# Patient Record
Sex: FEMALE | Race: WHITE | NOT HISPANIC OR LATINO | Employment: UNEMPLOYED | URBAN - METROPOLITAN AREA
[De-identification: names, ages, dates, MRNs, and addresses within clinical notes are randomized per-mention and may not be internally consistent; named-entity substitution may affect disease eponyms.]

---

## 2018-02-04 ENCOUNTER — APPOINTMENT (EMERGENCY)
Dept: RADIOLOGY | Facility: HOSPITAL | Age: 83
DRG: 640 | End: 2018-02-04
Payer: COMMERCIAL

## 2018-02-04 ENCOUNTER — APPOINTMENT (EMERGENCY)
Dept: CT IMAGING | Facility: HOSPITAL | Age: 83
DRG: 640 | End: 2018-02-04
Payer: COMMERCIAL

## 2018-02-04 ENCOUNTER — HOSPITAL ENCOUNTER (INPATIENT)
Facility: HOSPITAL | Age: 83
LOS: 4 days | Discharge: HOME HEALTH | DRG: 640 | End: 2018-02-08
Attending: EMERGENCY MEDICINE | Admitting: INTERNAL MEDICINE
Payer: COMMERCIAL

## 2018-02-04 DIAGNOSIS — G93.41 ACUTE METABOLIC ENCEPHALOPATHY: ICD-10-CM

## 2018-02-04 DIAGNOSIS — R11.2 NAUSEA AND VOMITING, INTRACTABILITY OF VOMITING NOT SPECIFIED, UNSPECIFIED VOMITING TYPE: ICD-10-CM

## 2018-02-04 DIAGNOSIS — E87.1 ACUTE HYPONATREMIA: Primary | ICD-10-CM

## 2018-02-04 DIAGNOSIS — J18.9 RIGHT LOWER LOBE PNEUMONIA: ICD-10-CM

## 2018-02-04 DIAGNOSIS — R11.10 VOMITING: ICD-10-CM

## 2018-02-04 PROBLEM — E11.9 TYPE 2 DIABETES MELLITUS (HCC): Status: ACTIVE | Noted: 2018-02-04

## 2018-02-04 LAB
ALBUMIN SERPL BCP-MCNC: 3.8 G/DL (ref 3.5–5)
ALP SERPL-CCNC: 66 U/L (ref 46–116)
ALT SERPL W P-5'-P-CCNC: 29 U/L (ref 12–78)
ANION GAP SERPL CALCULATED.3IONS-SCNC: 10 MMOL/L (ref 4–13)
ANION GAP SERPL CALCULATED.3IONS-SCNC: 12 MMOL/L (ref 4–13)
AST SERPL W P-5'-P-CCNC: 26 U/L (ref 5–45)
BASOPHILS # BLD AUTO: 0.04 THOUSANDS/ΜL (ref 0–0.1)
BASOPHILS NFR BLD AUTO: 0 % (ref 0–1)
BILIRUB SERPL-MCNC: 1.41 MG/DL (ref 0.2–1)
BILIRUB UR QL STRIP: NEGATIVE
BUN SERPL-MCNC: 13 MG/DL (ref 5–25)
BUN SERPL-MCNC: 14 MG/DL (ref 5–25)
CALCIUM SERPL-MCNC: 7.9 MG/DL (ref 8.3–10.1)
CALCIUM SERPL-MCNC: 8.2 MG/DL (ref 8.3–10.1)
CHLORIDE SERPL-SCNC: 80 MMOL/L (ref 100–108)
CHLORIDE SERPL-SCNC: 81 MMOL/L (ref 100–108)
CLARITY UR: CLEAR
CO2 SERPL-SCNC: 20 MMOL/L (ref 21–32)
CO2 SERPL-SCNC: 22 MMOL/L (ref 21–32)
COLOR UR: YELLOW
CREAT SERPL-MCNC: 0.64 MG/DL (ref 0.6–1.3)
CREAT SERPL-MCNC: 0.7 MG/DL (ref 0.6–1.3)
EOSINOPHIL # BLD AUTO: 0.11 THOUSAND/ΜL (ref 0–0.61)
EOSINOPHIL NFR BLD AUTO: 1 % (ref 0–6)
ERYTHROCYTE [DISTWIDTH] IN BLOOD BY AUTOMATED COUNT: 13.5 % (ref 11.6–15.1)
GFR SERPL CREATININE-BSD FRML MDRD: 79 ML/MIN/1.73SQ M
GFR SERPL CREATININE-BSD FRML MDRD: 82 ML/MIN/1.73SQ M
GLUCOSE SERPL-MCNC: 148 MG/DL (ref 65–140)
GLUCOSE SERPL-MCNC: 182 MG/DL (ref 65–140)
GLUCOSE SERPL-MCNC: 195 MG/DL (ref 65–140)
GLUCOSE UR STRIP-MCNC: NEGATIVE MG/DL
HCT VFR BLD AUTO: 30.6 % (ref 34.8–46.1)
HGB BLD-MCNC: 10.5 G/DL (ref 11.5–15.4)
HGB UR QL STRIP.AUTO: NEGATIVE
KETONES UR STRIP-MCNC: ABNORMAL MG/DL
L PNEUMO1 AG UR QL IA.RAPID: NEGATIVE
LEUKOCYTE ESTERASE UR QL STRIP: NEGATIVE
LIPASE SERPL-CCNC: 91 U/L (ref 73–393)
LYMPHOCYTES # BLD AUTO: 1.85 THOUSANDS/ΜL (ref 0.6–4.47)
LYMPHOCYTES NFR BLD AUTO: 17 % (ref 14–44)
MCH RBC QN AUTO: 30.3 PG (ref 26.8–34.3)
MCHC RBC AUTO-ENTMCNC: 34.3 G/DL (ref 31.4–37.4)
MCV RBC AUTO: 88 FL (ref 82–98)
MONOCYTES # BLD AUTO: 0.95 THOUSAND/ΜL (ref 0.17–1.22)
MONOCYTES NFR BLD AUTO: 9 % (ref 4–12)
NEUTROPHILS # BLD AUTO: 7.83 THOUSANDS/ΜL (ref 1.85–7.62)
NEUTS SEG NFR BLD AUTO: 73 % (ref 43–75)
NITRITE UR QL STRIP: NEGATIVE
NRBC BLD AUTO-RTO: 0 /100 WBCS
PH UR STRIP.AUTO: 7 [PH] (ref 4.5–8)
PLATELET # BLD AUTO: 351 THOUSANDS/UL (ref 149–390)
PMV BLD AUTO: 8.4 FL (ref 8.9–12.7)
POTASSIUM SERPL-SCNC: 4.3 MMOL/L (ref 3.5–5.3)
POTASSIUM SERPL-SCNC: 4.6 MMOL/L (ref 3.5–5.3)
PROT SERPL-MCNC: 7.3 G/DL (ref 6.4–8.2)
PROT UR STRIP-MCNC: NEGATIVE MG/DL
RBC # BLD AUTO: 3.47 MILLION/UL (ref 3.81–5.12)
S PNEUM AG UR QL: NEGATIVE
SODIUM SERPL-SCNC: 112 MMOL/L (ref 136–145)
SODIUM SERPL-SCNC: 113 MMOL/L (ref 136–145)
SP GR UR STRIP.AUTO: <=1.005 (ref 1–1.03)
TROPONIN I SERPL-MCNC: <0.02 NG/ML
UROBILINOGEN UR QL STRIP.AUTO: 0.2 E.U./DL
WBC # BLD AUTO: 10.78 THOUSAND/UL (ref 4.31–10.16)

## 2018-02-04 PROCEDURE — 87449 NOS EACH ORGANISM AG IA: CPT | Performed by: EMERGENCY MEDICINE

## 2018-02-04 PROCEDURE — 80053 COMPREHEN METABOLIC PANEL: CPT | Performed by: EMERGENCY MEDICINE

## 2018-02-04 PROCEDURE — 36415 COLL VENOUS BLD VENIPUNCTURE: CPT | Performed by: NURSE PRACTITIONER

## 2018-02-04 PROCEDURE — 96360 HYDRATION IV INFUSION INIT: CPT

## 2018-02-04 PROCEDURE — 85025 COMPLETE CBC W/AUTO DIFF WBC: CPT | Performed by: EMERGENCY MEDICINE

## 2018-02-04 PROCEDURE — 93005 ELECTROCARDIOGRAM TRACING: CPT | Performed by: EMERGENCY MEDICINE

## 2018-02-04 PROCEDURE — 82948 REAGENT STRIP/BLOOD GLUCOSE: CPT

## 2018-02-04 PROCEDURE — 71046 X-RAY EXAM CHEST 2 VIEWS: CPT

## 2018-02-04 PROCEDURE — 80048 BASIC METABOLIC PNL TOTAL CA: CPT | Performed by: NURSE PRACTITIONER

## 2018-02-04 PROCEDURE — 99291 CRITICAL CARE FIRST HOUR: CPT | Performed by: NURSE PRACTITIONER

## 2018-02-04 PROCEDURE — 81003 URINALYSIS AUTO W/O SCOPE: CPT | Performed by: EMERGENCY MEDICINE

## 2018-02-04 PROCEDURE — 99285 EMERGENCY DEPT VISIT HI MDM: CPT

## 2018-02-04 PROCEDURE — 36415 COLL VENOUS BLD VENIPUNCTURE: CPT

## 2018-02-04 PROCEDURE — 84484 ASSAY OF TROPONIN QUANT: CPT | Performed by: EMERGENCY MEDICINE

## 2018-02-04 PROCEDURE — 74177 CT ABD & PELVIS W/CONTRAST: CPT

## 2018-02-04 PROCEDURE — 83690 ASSAY OF LIPASE: CPT | Performed by: EMERGENCY MEDICINE

## 2018-02-04 RX ORDER — ONDANSETRON 2 MG/ML
INJECTION INTRAMUSCULAR; INTRAVENOUS
Status: COMPLETED
Start: 2018-02-04 | End: 2018-02-04

## 2018-02-04 RX ORDER — ONDANSETRON 2 MG/ML
8 INJECTION INTRAMUSCULAR; INTRAVENOUS EVERY 8 HOURS PRN
Status: DISCONTINUED | OUTPATIENT
Start: 2018-02-04 | End: 2018-02-06

## 2018-02-04 RX ORDER — CHLORHEXIDINE GLUCONATE 0.12 MG/ML
15 RINSE ORAL EVERY 12 HOURS SCHEDULED
Status: DISCONTINUED | OUTPATIENT
Start: 2018-02-04 | End: 2018-02-05

## 2018-02-04 RX ORDER — HYDRALAZINE HYDROCHLORIDE 20 MG/ML
5 INJECTION INTRAMUSCULAR; INTRAVENOUS ONCE
Status: COMPLETED | OUTPATIENT
Start: 2018-02-04 | End: 2018-02-04

## 2018-02-04 RX ORDER — LABETALOL HYDROCHLORIDE 5 MG/ML
10 INJECTION, SOLUTION INTRAVENOUS EVERY 6 HOURS PRN
Status: DISCONTINUED | OUTPATIENT
Start: 2018-02-04 | End: 2018-02-06

## 2018-02-04 RX ORDER — SODIUM CHLORIDE 9 MG/ML
125 INJECTION, SOLUTION INTRAVENOUS CONTINUOUS
Status: DISCONTINUED | OUTPATIENT
Start: 2018-02-04 | End: 2018-02-06

## 2018-02-04 RX ORDER — HEPARIN SODIUM 5000 [USP'U]/ML
5000 INJECTION, SOLUTION INTRAVENOUS; SUBCUTANEOUS EVERY 8 HOURS SCHEDULED
Status: DISCONTINUED | OUTPATIENT
Start: 2018-02-04 | End: 2018-02-08 | Stop reason: HOSPADM

## 2018-02-04 RX ADMIN — HYDRALAZINE HYDROCHLORIDE 5 MG: 20 INJECTION INTRAMUSCULAR; INTRAVENOUS at 20:27

## 2018-02-04 RX ADMIN — AZITHROMYCIN MONOHYDRATE 500 MG: 500 INJECTION, POWDER, LYOPHILIZED, FOR SOLUTION INTRAVENOUS at 22:23

## 2018-02-04 RX ADMIN — IOHEXOL 100 ML: 350 INJECTION, SOLUTION INTRAVENOUS at 19:43

## 2018-02-04 RX ADMIN — CHLORHEXIDINE GLUCONATE 15 ML: 1.2 RINSE ORAL at 22:06

## 2018-02-04 RX ADMIN — LABETALOL 20 MG/4 ML (5 MG/ML) INTRAVENOUS SYRINGE 10 MG: at 22:12

## 2018-02-04 RX ADMIN — SODIUM CHLORIDE 1000 ML: 0.9 INJECTION, SOLUTION INTRAVENOUS at 19:10

## 2018-02-04 RX ADMIN — CEFTRIAXONE 1000 MG: 1 INJECTION, POWDER, FOR SOLUTION INTRAMUSCULAR; INTRAVENOUS at 22:23

## 2018-02-04 RX ADMIN — HEPARIN SODIUM 5000 UNITS: 5000 INJECTION, SOLUTION INTRAVENOUS; SUBCUTANEOUS at 22:06

## 2018-02-04 RX ADMIN — SODIUM CHLORIDE 125 ML/HR: 0.9 INJECTION, SOLUTION INTRAVENOUS at 21:51

## 2018-02-04 RX ADMIN — ONDANSETRON 4 MG: 2 INJECTION INTRAMUSCULAR; INTRAVENOUS at 22:06

## 2018-02-04 RX ADMIN — ONDANSETRON 4 MG: 2 INJECTION INTRAMUSCULAR; INTRAVENOUS at 21:11

## 2018-02-04 NOTE — ED PROVIDER NOTES
History  Chief Complaint   Patient presents with    Vomiting     vomiting since wed, seen at [facility]  per EMS pt much weaker than dilmamichelle now unable to ambulate, also relateds pt was recently admitted for pneumonia and dc'd on levaquin EMS noted pt has not been taking as prescribed  69-year-old female presents for evaluation of vomiting  The patient presents from home after several episodes of vomiting  The patient was recently seen at [facility] for similar type symptoms and was diagnosed with pneumonia put on Levaquin  Per EMS, the patient has had significant decline since the last transport to the emergency department  She is normally ambulatory and independent at home  The patient was unable to get up and had to be assisted onto the stretcher today  The patient is confused and only answers very basic questions  History provided by:  Patient  History limited by:  Mental status change  Vomiting   Severity:  Unable to specify  Timing:  Unable to specify      None       Past Medical History:   Diagnosis Date    DM (diabetes mellitus) ([facility] [address] )     Hypertension        Past Surgical History:   Procedure Laterality Date    NO PAST SURGERIES         History reviewed  No pertinent family history  I have reviewed and agree with the history as documented  Social History   Substance Use Topics    Smoking status: Never Smoker    Smokeless tobacco: Never Used    Alcohol use No        Review of Systems   Unable to perform ROS: Mental status change   Gastrointestinal: Positive for nausea and vomiting  Neurological: Positive for weakness  All other systems reviewed and are negative        Physical Exam  ED Triage Vitals   Temperature Pulse Respirations Blood Pressure SpO2   02/04/18 1832 02/04/18 1832 02/04/18 1832 02/04/18 1832 02/04/18 1832   98 4 °F (36 9 °C) 84 20 (!) 199/76 99 %      Temp src Heart Rate Source Patient Position - Orthostatic VS BP Location FiO2 (%)   -- 02/04/18 1916 02/04/18 1832 02/04/18 1832 --    Monitor Sitting Right arm       Pain Score       02/04/18 1832       No Pain           Orthostatic Vital Signs  Vitals:    02/04/18 1832 02/04/18 1916 02/04/18 1953   BP: (!) 199/76 (!) 195/75 (!) 203/79   Pulse: 84 93 89   Patient Position - Orthostatic VS: Sitting         Physical Exam   Constitutional: She appears well-developed and well-nourished  No distress  HENT:   Head: Normocephalic and atraumatic  Right Ear: External ear normal    Left Ear: External ear normal    Eyes: Conjunctivae and EOM are normal  Pupils are equal, round, and reactive to light  No scleral icterus  Neck: Normal range of motion  Cardiovascular: Normal rate, regular rhythm and normal heart sounds  Pulmonary/Chest: Effort normal and breath sounds normal  No respiratory distress  Abdominal: Soft  Bowel sounds are normal  There is no tenderness  There is no rebound and no guarding  Musculoskeletal: Normal range of motion  She exhibits no edema  Neurological: She is alert  She is disoriented ( Place and time)  Skin: Skin is warm and dry  No rash noted  Psychiatric: She has a normal mood and affect  Nursing note and vitals reviewed        ED Medications  Medications   hydrALAZINE (APRESOLINE) injection 5 mg (not administered)   sodium chloride 0 9 % bolus 1,000 mL (1,000 mL Intravenous New Bag 2/4/18 1910)   iohexol (OMNIPAQUE) 350 MG/ML injection (MULTI-DOSE) 100 mL (100 mL Intravenous Given 2/4/18 1943)       Diagnostic Studies  Results Reviewed     Procedure Component Value Units Date/Time    Strep Pneumoniae, Urine [[id number]]     Lab Status:  No result Specimen:  Urine     Legionella antigen, urine [[id number]]     Lab Status:  No result Specimen:  Urine     Lipase [[id number]]  (Normal) Collected:  02/04/18 1836    Lab Status:  Final result Specimen:  Blood from Arm, Right Updated:  02/04/18 1918     Lipase 91 u/L     Comprehensive metabolic panel [[id number]]  (Abnormal) Collected:  02/04/18 1836    Lab Status:  Final result Specimen:  Blood from Arm, Right Updated:  02/04/18 1909     Sodium 112 (LL) mmol/L      Potassium 4 6 mmol/L      Chloride 80 (L) mmol/L      CO2 22 mmol/L      Anion Gap 10 mmol/L      BUN 14 mg/dL      Creatinine 0 70 mg/dL      Glucose 148 (H) mg/dL      Calcium 8 2 (L) mg/dL      AST 26 U/L      ALT 29 U/L      Alkaline Phosphatase 66 U/L      Total Protein 7 3 g/dL      Albumin 3 8 g/dL      Total Bilirubin 1 41 (H) mg/dL      eGFR 79 ml/min/1 73sq m     Narrative:         National Kidney Disease Education Program recommendations are as follows:  GFR calculation is accurate only with a steady state creatinine  Chronic Kidney disease less than 60 ml/min/1 73 sq  meters  Kidney failure less than 15 ml/min/1 73 sq  meters  Troponin I [[id number]]  (Normal) Collected:  02/04/18 1836    Lab Status:  Final result Specimen:  Blood from Arm, Right Updated:  02/04/18 1900     Troponin I <0 02 ng/mL     Narrative:         [employer] Chemistry analyzer 99% cutoff is > 0 04 ng/mL in network labs    o cTnI 99% cutoff is useful only when applied to patients in the clinical setting of myocardial ischemia  o cTnI 99% cutoff should be interpreted in the context of clinical history, ECG findings and possibly cardiac imaging to establish correct diagnosis  o cTnI 99% cutoff may be suggestive but clearly not indicative of a coronary event without the clinical setting of myocardial ischemia      UA w Reflex to Microscopic [[id number]]     Lab Status:  No result Specimen:  Urine     CBC and differential [[id number]]  (Abnormal) Collected:  02/04/18 1836    Lab Status:  Final result Specimen:  Blood from Arm, Right Updated:  02/04/18 1843     WBC 10 78 (H) Thousand/uL      RBC 3 47 (L) Million/uL      Hemoglobin 10 5 (L) g/dL      Hematocrit 30 6 (L) %      MCV 88 fL      MCH 30 3 pg      MCHC 34 3 g/dL      RDW 13 5 %      MPV 8 4 (L) fL      Platelets 319 Thousands/uL      nRBC 0 /100 WBCs      Neutrophils Relative 73 %      Lymphocytes Relative 17 %      Monocytes Relative 9 %      Eosinophils Relative 1 %      Basophils Relative 0 %      Neutrophils Absolute 7 83 (H) Thousands/µL      Lymphocytes Absolute 1 85 Thousands/µL      Monocytes Absolute 0 95 Thousand/µL      Eosinophils Absolute 0 11 Thousand/µL      Basophils Absolute 0 04 Thousands/µL                  X-ray chest 2 views   Final Result by Milan Hightower MD (02/04 1958)      No active pulmonary disease  Workstation performed: [id number]         CT abdomen pelvis with contrast   Final Result by Milan Hightower MD (02/04 1955)      There are patchy airspace opacities in the right lower lobe suspicious for pneumonia  Cholelithiasis without CT evidence of acute cholecystitis  There is a 1 2 cm cystic lesion in the pancreatic head (series 2 image 31 ) In younger patients, these are typically characterize by abdomen MRI with and without IV contrast/MRCP  First alternative is pancreatic protocol abdomen and pelvic CT with    contrast  Second is abdomen MRI without contrast/MRCP  Considerations related to the patient's age and/or comorbidities may be used to alter these recommendations  The recommendations regarding pancreatic findings assumes that patient does not have family history of pancreatic cancer nor have any symptoms potentially attributable to pancreatic cystic lesions (hyperamylasemia, recent-onset diabetes, severe    epigastric pain, weight loss, steatorrhea, or jaundice ) If these conditions are not true, then management should be deferred to judgement of specialists such as gastroenterologists or oncologic surgeons  Recommendations are based on recent consensus    statements on management of pancreatic cystic lesions from [address] [place] [address] Gastroenterology Association, [employer] of [employer], the journal Pancreatology, and our own institutional consensus                 Workstation performed: [id number]                    Procedures  ECG 12 Lead Documentation  Date/Time: 2/4/2018 6:51 PM  Performed by: Wyatt Buchanan  Authorized by: Neelima ALBERT     Indications / Diagnosis:  Vomiting  ECG reviewed by me, the ED Provider: yes    Patient location:  ED  Previous ECG:     Previous ECG:  Unavailable  Interpretation:     Interpretation: normal    Rate:     ECG rate:  83    ECG rate assessment: normal    Rhythm:     Rhythm: sinus rhythm    Ectopy:     Ectopy: none    QRS:     QRS axis:  Normal    QRS intervals:  Normal  Conduction:     Conduction: abnormal      Abnormal conduction: complete RBBB and 1st degree    ST segments:     ST segments:  Normal  T waves:     T waves: non-specific    CriticalCare Time  Performed by: Wyatt Buchanan  Authorized by: Neelima ALBERT     Critical care provider statement:     Critical care time (minutes):  35    Critical care time was exclusive of:  Separately billable procedures and treating other patients and teaching time    Critical care was necessary to treat or prevent imminent or life-threatening deterioration of the following conditions:  CNS failure or compromise and endocrine crisis    Critical care was time spent personally by me on the following activities:  Blood draw for specimens, obtaining history from patient or surrogate, development of treatment plan with patient or surrogate, discussions with consultants, evaluation of patient's response to treatment, examination of patient, ordering and performing treatments and interventions, ordering and review of laboratory studies, ordering and review of radiographic studies, re-evaluation of patient's condition, review of old charts and interpretation of cardiac output measurements    I assumed direction of critical care for this patient from another provider in my specialty: no    Comments:      IV antihypertensives and treatment of severe hyponatremia with associated mental status change           Phone Contacts  ED Phone Contact    ED Course  ED Course as of Feb 04 2020   Chip Dear Feb 04, 2018 2015 I discussed the case with the intensivist  We reviewed the HPI, pertinent PMH, ED course and workup  Dr Rossana Landis agreed with plan and will admit the patient to the hospital                                 [facility]  Number of Diagnoses or Management Options  Acute hyponatremia: new and requires workup  Right lower lobe pneumonia [facility]): new and requires workup  Vomiting: new and requires workup  Diagnosis management comments: Differential diagnosis:  Urinary tract infection, dehydration, electrolyte disturbance, pneumonia, pancreatitis cholecystitis, bowel obstruction, other    All labs reviewed and utilized in the medical decision making process    All radiology studies independently viewed by me and interpreted by the radiologist          Amount and/or Complexity of Data Reviewed  Clinical lab tests: ordered and reviewed  Tests in the radiology section of CPT®: ordered and reviewed  Tests in the medicine section of CPT®: ordered and reviewed  Review and summarize past medical records: yes  Discuss the patient with other providers: yes  Independent visualization of images, tracings, or specimens: yes      CritCare Time    Disposition  Final diagnoses:   Acute hyponatremia   Vomiting   Right lower lobe pneumonia ([address] )     Time reflects when diagnosis was documented in both MDM as applicable and the Disposition within this note     Time User Action Codes Description Comment    2/4/2018  7:15 PM Ariella Jackson [E87 1] Acute hyponatremia     2/4/2018  7:16 PM Livier Hwang [R11 10] Vomiting     2/4/2018  8:17 PM Ariella Duncan Add [J18 1] Right lower lobe pneumonia [facility])       ED Disposition     ED Disposition Condition Comment    Admit  Case was discussed with Dr Rossana Landis and the patient's admission status was agreed to be Admission Status: inpatient status to the service of Dr Rossana Landis           Follow-up Information None       Patient's Medications    No medications on file     No discharge procedures on file      ED Provider  Electronically Signed by           Augusta Nelson DO  02/04/18 6849

## 2018-02-05 PROBLEM — J69.0 ASPIRATION PNEUMONIA OF RIGHT LOWER LOBE DUE TO GASTRIC SECRETIONS (HCC): Status: ACTIVE | Noted: 2018-02-04

## 2018-02-05 LAB
ANION GAP SERPL CALCULATED.3IONS-SCNC: 11 MMOL/L (ref 4–13)
ANION GAP SERPL CALCULATED.3IONS-SCNC: 12 MMOL/L (ref 4–13)
ATRIAL RATE: 83 BPM
BASOPHILS # BLD AUTO: 0.02 THOUSANDS/ΜL (ref 0–0.1)
BASOPHILS NFR BLD AUTO: 0 % (ref 0–1)
BUN SERPL-MCNC: 10 MG/DL (ref 5–25)
BUN SERPL-MCNC: 11 MG/DL (ref 5–25)
CALCIUM SERPL-MCNC: 7.7 MG/DL (ref 8.3–10.1)
CALCIUM SERPL-MCNC: 8.1 MG/DL (ref 8.3–10.1)
CHLORIDE SERPL-SCNC: 83 MMOL/L (ref 100–108)
CHLORIDE SERPL-SCNC: 85 MMOL/L (ref 100–108)
CO2 SERPL-SCNC: 20 MMOL/L (ref 21–32)
CO2 SERPL-SCNC: 20 MMOL/L (ref 21–32)
CREAT SERPL-MCNC: 0.66 MG/DL (ref 0.6–1.3)
CREAT SERPL-MCNC: 0.67 MG/DL (ref 0.6–1.3)
EOSINOPHIL # BLD AUTO: 0.01 THOUSAND/ΜL (ref 0–0.61)
EOSINOPHIL NFR BLD AUTO: 0 % (ref 0–6)
ERYTHROCYTE [DISTWIDTH] IN BLOOD BY AUTOMATED COUNT: 13.6 % (ref 11.6–15.1)
GFR SERPL CREATININE-BSD FRML MDRD: 80 ML/MIN/1.73SQ M
GFR SERPL CREATININE-BSD FRML MDRD: 81 ML/MIN/1.73SQ M
GLUCOSE SERPL-MCNC: 121 MG/DL (ref 65–140)
GLUCOSE SERPL-MCNC: 128 MG/DL (ref 65–140)
GLUCOSE SERPL-MCNC: 137 MG/DL (ref 65–140)
GLUCOSE SERPL-MCNC: 149 MG/DL (ref 65–140)
GLUCOSE SERPL-MCNC: 211 MG/DL (ref 65–140)
GLUCOSE SERPL-MCNC: 212 MG/DL (ref 65–140)
HCT VFR BLD AUTO: 29 % (ref 34.8–46.1)
HGB BLD-MCNC: 10.3 G/DL (ref 11.5–15.4)
LYMPHOCYTES # BLD AUTO: 1.95 THOUSANDS/ΜL (ref 0.6–4.47)
LYMPHOCYTES NFR BLD AUTO: 13 % (ref 14–44)
MCH RBC QN AUTO: 30.8 PG (ref 26.8–34.3)
MCHC RBC AUTO-ENTMCNC: 35.5 G/DL (ref 31.4–37.4)
MCV RBC AUTO: 87 FL (ref 82–98)
MONOCYTES # BLD AUTO: 1.08 THOUSAND/ΜL (ref 0.17–1.22)
MONOCYTES NFR BLD AUTO: 7 % (ref 4–12)
NEUTROPHILS # BLD AUTO: 11.72 THOUSANDS/ΜL (ref 1.85–7.62)
NEUTS SEG NFR BLD AUTO: 80 % (ref 43–75)
NRBC BLD AUTO-RTO: 0 /100 WBCS
P AXIS: 79 DEGREES
PLATELET # BLD AUTO: 361 THOUSANDS/UL (ref 149–390)
PMV BLD AUTO: 8.7 FL (ref 8.9–12.7)
POTASSIUM SERPL-SCNC: 3.8 MMOL/L (ref 3.5–5.3)
POTASSIUM SERPL-SCNC: 3.9 MMOL/L (ref 3.5–5.3)
PR INTERVAL: 226 MS
QRS AXIS: 93 DEGREES
QRSD INTERVAL: 144 MS
QT INTERVAL: 392 MS
QTC INTERVAL: 460 MS
RBC # BLD AUTO: 3.34 MILLION/UL (ref 3.81–5.12)
SODIUM SERPL-SCNC: 114 MMOL/L (ref 136–145)
SODIUM SERPL-SCNC: 117 MMOL/L (ref 136–145)
SODIUM SERPL-SCNC: 118 MMOL/L (ref 136–145)
SODIUM SERPL-SCNC: 121 MMOL/L (ref 136–145)
SODIUM SERPL-SCNC: 122 MMOL/L (ref 136–145)
SODIUM SERPL-SCNC: 126 MMOL/L (ref 136–145)
T WAVE AXIS: 32 DEGREES
VENTRICULAR RATE: 83 BPM
WBC # BLD AUTO: 14.78 THOUSAND/UL (ref 4.31–10.16)

## 2018-02-05 PROCEDURE — 82948 REAGENT STRIP/BLOOD GLUCOSE: CPT

## 2018-02-05 PROCEDURE — 99233 SBSQ HOSP IP/OBS HIGH 50: CPT | Performed by: INTERNAL MEDICINE

## 2018-02-05 PROCEDURE — 93010 ELECTROCARDIOGRAM REPORT: CPT | Performed by: INTERNAL MEDICINE

## 2018-02-05 PROCEDURE — 80048 BASIC METABOLIC PNL TOTAL CA: CPT | Performed by: NURSE PRACTITIONER

## 2018-02-05 PROCEDURE — 85025 COMPLETE CBC W/AUTO DIFF WBC: CPT | Performed by: NURSE PRACTITIONER

## 2018-02-05 PROCEDURE — 84295 ASSAY OF SERUM SODIUM: CPT | Performed by: INTERNAL MEDICINE

## 2018-02-05 PROCEDURE — 84295 ASSAY OF SERUM SODIUM: CPT | Performed by: NURSE PRACTITIONER

## 2018-02-05 PROCEDURE — 80048 BASIC METABOLIC PNL TOTAL CA: CPT | Performed by: INTERNAL MEDICINE

## 2018-02-05 RX ORDER — SODIUM CHLORIDE 9 MG/ML
75 INJECTION, SOLUTION INTRAVENOUS CONTINUOUS
Status: DISCONTINUED | OUTPATIENT
Start: 2018-02-05 | End: 2018-02-07

## 2018-02-05 RX ORDER — DEXTROSE MONOHYDRATE 50 MG/ML
125 INJECTION, SOLUTION INTRAVENOUS CONTINUOUS
Status: DISCONTINUED | OUTPATIENT
Start: 2018-02-05 | End: 2018-02-06

## 2018-02-05 RX ORDER — PROMETHAZINE HYDROCHLORIDE 25 MG/ML
12.5 INJECTION, SOLUTION INTRAMUSCULAR; INTRAVENOUS ONCE
Status: COMPLETED | OUTPATIENT
Start: 2018-02-05 | End: 2018-02-05

## 2018-02-05 RX ADMIN — CEFTRIAXONE 1000 MG: 1 INJECTION, POWDER, FOR SOLUTION INTRAMUSCULAR; INTRAVENOUS at 21:22

## 2018-02-05 RX ADMIN — ONDANSETRON 8 MG: 2 INJECTION INTRAMUSCULAR; INTRAVENOUS at 06:06

## 2018-02-05 RX ADMIN — SODIUM CHLORIDE 125 ML/HR: 0.9 INJECTION, SOLUTION INTRAVENOUS at 08:11

## 2018-02-05 RX ADMIN — PROMETHAZINE HYDROCHLORIDE 12.5 MG: 25 INJECTION INTRAMUSCULAR; INTRAVENOUS at 09:54

## 2018-02-05 RX ADMIN — ONDANSETRON 8 MG: 2 INJECTION INTRAMUSCULAR; INTRAVENOUS at 13:22

## 2018-02-05 RX ADMIN — DEXTROSE 125 ML/HR: 5 SOLUTION INTRAVENOUS at 23:07

## 2018-02-05 RX ADMIN — SODIUM CHLORIDE 125 ML/HR: 0.9 INJECTION, SOLUTION INTRAVENOUS at 16:32

## 2018-02-05 RX ADMIN — CHLORHEXIDINE GLUCONATE 15 ML: 1.2 RINSE ORAL at 08:16

## 2018-02-05 RX ADMIN — HEPARIN SODIUM 5000 UNITS: 5000 INJECTION, SOLUTION INTRAVENOUS; SUBCUTANEOUS at 13:26

## 2018-02-05 RX ADMIN — HEPARIN SODIUM 5000 UNITS: 5000 INJECTION, SOLUTION INTRAVENOUS; SUBCUTANEOUS at 21:22

## 2018-02-05 RX ADMIN — HEPARIN SODIUM 5000 UNITS: 5000 INJECTION, SOLUTION INTRAVENOUS; SUBCUTANEOUS at 05:33

## 2018-02-05 RX ADMIN — LABETALOL 20 MG/4 ML (5 MG/ML) INTRAVENOUS SYRINGE 10 MG: at 13:21

## 2018-02-05 NOTE — H&P
History & Physical Exam - [address] [facility] 80 y o  female MRN: [id number]  Unit/Bed#: ICU 15 Encounter: [id number]      Assessment/Plan:  1  Severe hyponatremia  · Given the severity of the patient's hyponatremia we will admit her to the step-down unit for evaluation and monitoring  This will likely require greater than a 2 midnight stay therefore the patient will be placed in inpatient status  · We will continue the patient on normal saline for now as she is not having seizures requiring 3% normal saline  We will monitor her sodium level closely every 4 hours with our goal to increase it no more than 8-10 mEq in a 24 hour time frame  2  Community acquired pneumonia  · Ceftriaxone and zithromax  · Will check legionella and strep antigen  3  Diabetes Mellitus  · We will place the patient on subcutaneous insulin with a goal to maintain her blood glucose between 140 and 180  3  Nausea and vomiting  · This is likely multifactorial related to her community-acquired pneumonia and possibly contributing to her hyponatremia  · We will continue her on Zosyn for now  4  Hypertension  · The patient was treated with a dose of labetalol with improvement of her blood pressure  5   Encephalopathy likely related to 1   6   1 2 cm cystic lesion in the pancreatic head  · We will discuss with the patient further when her mental status improves regarding her family or personal history of pancreatic cancer  We may need to consult Gastroenterology for assistance with the care of this lesion  Critical Care Time: 38 minutes  Documented critical care time excludes any procedures documented elsewhere  It also excludes any family updates    _____________________________________________________________________      HPI:    Mary Kay is a 80 y o  female who presents with a complaint of nausea and vomiting  The patient was recently seen at [facility] and diagnosed with community-acquired pneumonia    She was placed on Levaquin  There is some question that the patient was not able to take the medication appropriately  She called 911 today because she was complaining of increasing nausea vomiting and weakness  Currently she complains of not feeling well with nausea  She denies chest pain or shortness of breath       Review of Systems:    Full 14 point review of systems was performed  Aside from what was mentioned in the HPI, it is otherwise negative  Historical Information   Past Medical History:   Diagnosis Date    DM [facility]diabetes mellitus) ([facility] [address] )     Hypertension      Past Surgical History:   Procedure Laterality Date    NO PAST SURGERIES       Social History   History   Alcohol Use No     History   Drug Use No     History   Smoking Status    Never Smoker   Smokeless Tobacco    Never Used       Family History:   History reviewed  No pertinent family history  Medications:  Pertinent medications were reviewed    Current Facility-Administered Medications:  azithromycin 500 mg Intravenous Q24H Evertt Heys, CRNP    cefTRIAXone 1,000 mg Intravenous Q24H Evertt Heys, CRNP Last Rate: 1,000 mg (02/04/18 2223)   chlorhexidine 15 mL Swish & Spit Q12H [address] Evertt Heys, CRNP    heparin (porcine) 5,000 Units Subcutaneous [facility] Evertt Heys, CRNP    labetalol 10 mg Intravenous Q6H PRN Evertt Heys, CRNP    ondansetron 8 mg Intravenous Q8H PRN Evertt Heys, CRNP    sodium chloride 125 mL/hr Intravenous Continuous Evertt Heys, CRNP Last Rate: 125 mL/hr (02/04/18 2151)         No Known Allergies      Vitals:   /70 (BP Location: Left arm)   Pulse 94   Temp 98 4 °F (36 9 °C)   Resp (!) 26   Wt 64 9 kg (143 lb)   SpO2 98%   There is no height or weight on file to calculate BMI    SpO2: 98 %,   SpO2 Activity: At Rest,   O2 Device: Nasal cannula    No intake or output data in the 24 hours ending 02/04/18 2225  Invasive Devices     Peripheral Intravenous Line            Peripheral IV 02/04/18 Left Hand less than 1 day    Peripheral IV 02/04/18 Right Antecubital less than 1 day                Physical Exam:  Gen:  Awake and slightly confused  HEENT:  Pupils are equal round reactive to light  Neck:  Supple negative for lymphadenopathy  Chest:  Diminished in the bases right greater than left  Cor:  Regular rate and rhythm  Abd:  Soft and nontender  Ext:  There is no edema clubbing or cyanosis  Neuro:  Awake but slightly confused  Able to move her extremities  Skin:  Warm and dry      Diagnostic Data:  Lab: I have personally reviewed pertinent lab results  ,   CBC:    Results from last 7 days  Lab Units 02/04/18  1836   WBC Thousand/uL 10 78*   HEMOGLOBIN g/dL 10 5*   HEMATOCRIT % 30 6*   PLATELETS Thousands/uL 351      CMP: Lab Results   Component Value Date     (LL) 02/04/2018    K 4 3 02/04/2018    CL 81 (L) 02/04/2018    CO2 20 (L) 02/04/2018    ANIONGAP 12 02/04/2018    BUN 13 02/04/2018    CREATININE 0 64 02/04/2018    GLUCOSE 182 (H) 02/04/2018    CALCIUM 7 9 (L) 02/04/2018    AST 26 02/04/2018    ALT 29 02/04/2018    ALKPHOS 66 02/04/2018    PROT 7 3 02/04/2018    BILITOT 1 41 (H) 02/04/2018    EGFR 82 02/04/2018   ,   PT/INR: No results found for: PT, INR,   Magnesium: No results found for: MAG,  Phosphorous: No results found for: PHOS    ABG: No results found for: PHART, XJR3CHR, PO2ART, LZI7OEC, P8LCUPKX, BEART, SOURCE,     Microbiology:      Imaging: I have personally reviewed the pertinent imaging studies on the PACS system  CT scan of the abdomen and pelvis was performed  It reveals patchy airspace opacities in the right lower lobe concerning for pneumonia  There is cholelithiasis without evidence of acute cholecystitis  There is a 1 2 cm cystic lesion in the pancreatic head      Cardiac/EKG/telemetry/Echo:     Results from last 7 days  Lab Units 02/04/18  1836   TROPONIN I ng/mL <0 02           VTE Prophylaxis: Heparin    Code Status: Level 1 - Full Code    [place] Grade, CRNP    Portions of the record may have been created with voice recognition software  Occasional wrong word or "sound a like" substitutions may have occurred due to the inherent limitations of voice recognition software  Read the chart carefully and recognize, using context, where substitutions have occurred

## 2018-02-05 NOTE — ED NOTES
Pt does not recall medications being taken at home and does not have a list with her        Akin Melo, BOBBY  02/04/18 1713

## 2018-02-05 NOTE — PROGRESS NOTES
Progress Note - [address] 80 y o  female MRN: [id number]  Unit/Bed#: ICU 15 Encounter: [id number]    Assessment/Plan:  1  Severe hyponatremia  · Patient has had a slow rise in her sodium level  We will continue her on normal saline for now with our goal to maintain a increase of 8-10 mEq in a 24 hour time frame  · We will continue to monitor her sodium level every 4 hours  2  Community-acquired pneumonia  · We will continue her on ceftriaxone and Zithromax for now pending her culture results  3  Nausea and vomiting, questionable gastroenteritis  · The patient continues to have nausea which may in part be related to an contributing to her severe hyponatremia  There is a possibility that the patient has a coexisting gastroenteritis  We will continue with supportive care for now  4  Diabetes mellitus type 2  · We will continue the patient on sliding scale insulin with a goal to maintain the blood glucose between 140 and 180  5  Encephalopathy, likely related to 1  Critical Care Time:   Documented critical care time excludes any procedures documented elsewhere  It also excludes any family updates    _____________________________________________________________________    HPI/24hr events:   Events of the admission are noted  The patient still has some nausea this morning      Medications:    Current Facility-Administered Medications:  azithromycin 500 mg Intravenous Q24H VERONIQUE Felipe    cefTRIAXone 1,000 mg Intravenous Q24H VERONIQUE Felipe Last Rate: 1,000 mg (02/04/18 2223)   chlorhexidine 15 mL Swish & Spit Q12H [facility] VERONIQUE Felipe    heparin (porcine) 5,000 Units Subcutaneous [facility] VERONIQUE Felipe    labetalol 10 mg Intravenous Q6H PRN VERONIQUE Felipe    ondansetron 8 mg Intravenous Q8H PRN VERONIQUE Felipe    sodium chloride 125 mL/hr Intravenous Continuous VERONIQUE Felipe Last Rate: 125 mL/hr (02/04/18 2151)         sodium chloride 125 mL/hr Last Rate: 125 mL/hr (02/04/18 2151)         Physical exam:  Vitals: There is no height or weight on file to calculate BMI  Blood pressure 164/69, pulse 97, temperature 98 4 °F (36 9 °C), resp  rate (!) 32, weight 64 9 kg (143 lb), SpO2 100 %  ,  Temp  Min: 98 4 °F (36 9 °C)  Max: 98 4 °F (36 9 °C)  IBW: -92 5 kg    SpO2: 100 %  SpO2 Activity: At Rest  O2 Device: Nasal cannula    No intake or output data in the 24 hours ending 02/05/18 0619    Invasive/non-invasive ventilation settings:   Respiratory    Lab Data (Last 4 hours)    None         O2/Vent Data (Last 4 hours)    None              Invasive Devices     Peripheral Intravenous Line            Peripheral IV 02/04/18 Left Hand less than 1 day    Peripheral IV 02/04/18 Right Antecubital less than 1 day                  Physical Exam:  Gen:  Awake but slow to respond  HEENT:  Pupils are equal round reactive to light  Neck:  Supple negative for lymphadenopathy  Chest:  Diminished in the bases bilaterally  Cor:  Regular rate and rhythm  Abd:  Soft and nontender  Ext:  There is no significant edema clubbing or cyanosis  Neuro:  Awake but slow to respond, able to move her extremities but weak  Skin:  Warm and dry      Diagnostic Data:  Lab: I have personally reviewed pertinent lab results     CBC:     Results from last 7 days  Lab Units 02/05/18  0527 02/04/18  1836   WBC Thousand/uL 14 78* 10 78*   HEMOGLOBIN g/dL 10 3* 10 5*   HEMATOCRIT % 29 0* 30 6*   PLATELETS Thousands/uL 361 351       CMP:     Results from last 7 days  Lab Units 02/05/18  0527 02/05/18  0125 02/04/18  2100 02/04/18  1836   SODIUM mmol/L 117* 114* 113* 112*   POTASSIUM mmol/L 3 9 3 8 4 3 4 6   CHLORIDE mmol/L 85* 83* 81* 80*   CO2 mmol/L 20* 20* 20* 22   BUN mg/dL 10 11 13 14   CREATININE mg/dL 0 66 0 67 0 64 0 70   CALCIUM mg/dL 8 1* 7 7* 7 9* 8 2*   TOTAL PROTEIN g/dL  --   --   --  7 3   BILIRUBIN TOTAL mg/dL  --   --   --  1 41*   ALK PHOS U/L  --   --   --  66   ALT U/L  --   --   --  29   AST U/L  --   --   --  26   GLUCOSE RANDOM mg/dL 149* 211* 182* 148*     PT/INR:   No results found for: PT, INR,   Magnesium:     Phosphorous:       Microbiology:    Results from last 7 days  Lab Units 02/04/18 2022   STREP PNEUMONIAE ANTIGEN, URINE  Negative   LEGIONELLA URINARY ANTIGEN  Negative       Imaging:      Cardiac lab/EKG/telemetry/ECHO:       VTE Prophylaxis:  Heparin    Code Status: Level 1 - Full Code    VERONIQUE Small    Portions of the record may have been created with voice recognition software  Occasional wrong word or "sound a like" substitutions may have occurred due to the inherent limitations of voice recognition software  Read the chart carefully and recognize, using context, where substitutions have occurred

## 2018-02-05 NOTE — SOCIAL WORK
CM met with the patient to do a general open; the patient lives by herself in a bi-level home  Her POA is her niece, Shayy Olson [phone]  Her neighbors son does daily check ins with the patient was Shayy Olson lives a half an hour away  The patient is independent with adls and ambulation, when well she drives herself to all appointments  Her PCP is Dr Gutierrez Abreu with [facility]  She is current with [facility] VNA services - Delfina sent a referral for return of care  Hx of rehab after a THR - [facility] and then to an correction afterwards  [facility] uses [facility] on [address] for rx needs  Shayy Olson will provide transport home at discharge  DELFINA did speak with POA today over the phone, she plans to visit the patient tonight, she stated that the patient typically has all care provided through [facility]  CM following for any discharge needs

## 2018-02-05 NOTE — CASE MANAGEMENT
Initial Clinical Review    Admission: Date/Time/Statement: 2/4/18 @ 2018     Orders Placed This Encounter   Procedures    Inpatient Admission (expected length of stay for this patient is greater than two midnights)     Standing Status:   Standing     Number of Occurrences:   1     Order Specific Question:   Admitting Physician     Answer:   Lawrence Range [[id number]]     Order Specific Question:   Level of Care     Answer:   Level 1 Stepdown [13]     Order Specific Question:   Estimated length of stay     Answer:   More than 2 Midnights     Order Specific Question:   Certification     Answer:   I certify that inpatient services are medically necessary for this patient for a duration of greater than two midnights  See H&P and MD Progress Notes for additional information about the patient's course of treatment  ED: Date/Time/Mode of Arrival:   ED Arrival Information     Expected Arrival Acuity Means of Arrival Escorted By Service Admission Type    - 2/4/2018 18:28 Emergent Ambulance [place] [facility] Pulmonology Emergency    Arrival Complaint    Nausea          Chief Complaint:   Chief Complaint   Patient presents with    Vomiting     vomiting since wed, seen at [facility]  per EMS pt much weaker than thurs now unable to ambulate, also relateds pt was recently admitted for pneumonia and dc'd on levaquin EMS noted pt has not been taking as prescribed  History of Illness:    Micaela Santos is a 80 y o  female who presents with a complaint of nausea and vomiting  The patient was recently seen at [facility] and diagnosed with community-acquired pneumonia  She was placed on Levaquin  There is some question that the patient was not able to take the medication appropriately  She called 911 today because she was complaining of increasing nausea vomiting and weakness  Currently she complains of not feeling well with nausea  She denies chest pain or shortness of breath       ED Vital Signs:   ED Triage Vitals   Temperature Pulse Respirations Blood Pressure SpO2   02/04/18 1832 02/04/18 1832 02/04/18 1832 02/04/18 1832 02/04/18 1832   98 4 °F (36 9 °C) 84 20 (!) 199/76 99 %      Temp Source Heart Rate Source Patient Position - Orthostatic VS BP Location FiO2 (%)   02/05/18 0724 02/04/18 1916 02/04/18 1832 02/04/18 1832 --   Temporal Monitor Sitting Right arm       Pain Score       02/04/18 1832       No Pain        Wt Readings from Last 1 Encounters:   02/04/18 64 9 kg (143 lb)       Vital Signs (abnormal):    above    Abnormal Labs/Diagnostic Test Results:   NA  112  Chloride   80  Total  Bili   1 41  WBC   10 78  H/H   10 5/30 6  Abs neutro    7 82  Ct  Abd/pelvis: There are patchy airspace opacities in the right lower lobe suspicious for pneumonia  Cholelithiasis without CT evidence of acute cholecystitis  There is a 1 2 cm cystic lesion in the pancreatic head (series 2 image 31 ) In younger patients, these are typically characterize by abdomen MRI with and without IV contrast/MRCP  First alternative is pancreatic protocol abdomen and pelvic CT with   contrast  Second is abdomen MRI without contrast/MRCP   Considerations related to the patient's age and/or comorbidities may be used to alter these recommendations  CXR:  NAD    ED Treatment:   Medication Administration from 02/04/2018 1828 to 02/04/2018 2142       Date/Time Order Dose Route Action Action by Comments     02/04/2018 2021 sodium chloride 0 9 % bolus 1,000 mL 0 mL Intravenous Stopped Cathey Meigs, RN      02/04/2018 1910 sodium chloride 0 9 % bolus 1,000 mL 1,000 mL Intravenous 5655 Frist Aragon Sera Llamas RN      02/04/2018 1943 iohexol (OMNIPAQUE) 350 MG/ML injection (MULTI-DOSE) 100 mL 100 mL Intravenous Given Lillie Friday 02/04/2018 2027 hydrALAZINE (APRESOLINE) injection 5 mg 5 mg Intravenous Given Cathey Meigs, RN      02/04/2018 2111 ondansetron (ZOFRAN) 4 mg/2 mL injection **AcuDose Override Pull** 4 mg  Given Sirisha Patient Sera Llamas RN Verbal order obtained from [facility] in ICU  Made aware pt was vomiting  Past Medical/Surgical History: Active Ambulatory Problems     Diagnosis Date Noted    No Active Ambulatory Problems     Resolved Ambulatory Problems     Diagnosis Date Noted    No Resolved Ambulatory Problems     Past Medical History:   Diagnosis Date    DM (diabetes mellitus) ([address] )     Hypertension        Admitting Diagnosis: Acute hyponatremia [E87 1]  Nausea [R11 0]  Vomiting [R11 10]  Right lower lobe pneumonia ([address] ) [J18 1]    Age/Sex: 80 y o  female    1  Assessment/Plan:    Severe hyponatremia  · Given the severity of the patient's hyponatremia we will admit her to the step-down unit for evaluation and monitoring  This will likely require greater than a 2 midnight stay therefore the patient will be placed in inpatient status  · We will continue the patient on normal saline for now as she is not having seizures requiring 3% normal saline  We will monitor her sodium level closely every 4 hours with our goal to increase it no more than 8-10 mEq in a 24 hour time frame  2  Community acquired pneumonia  · Ceftriaxone and zithromax  · Will check legionella and strep antigen  3  Diabetes Mellitus  · We will place the patient on subcutaneous insulin with a goal to maintain her blood glucose between 140 and 180  3  Nausea and vomiting  · This is likely multifactorial related to her community-acquired pneumonia and possibly contributing to her hyponatremia  · We will continue her on Zosyn for now  4  Hypertension  ? The patient was treated with a dose of labetalol with improvement of her blood pressure  5   Encephalopathy likely related to 1   6   1 2 cm cystic lesion in the pancreatic head  ? We will discuss with the patient further when her mental status improves regarding her family or personal history of pancreatic cancer    We may need to consult Gastroenterology for assistance with the care of this lesion        Admission Orders:   IP    2/4  @    2018  Scheduled Meds:   Current Facility-Administered Medications:  cefTRIAXone 1,000 mg Intravenous Q24H Thirza Gums, CRNP Last Rate: 1,000 mg (02/04/18 2223)   chlorhexidine 15 mL Swish & Spit Q12H [facility] Thirza Gums, CRNP    heparin (porcine) 5,000 Units Subcutaneous [facility] Thirza Gums, CRNP    labetalol 10 mg Intravenous Q6H PRN Thirza Gums, CRNP    ondansetron 8 mg Intravenous Q8H PRN Thirza Gums, CRNP    sodium chloride 125 mL/hr Intravenous Continuous Thirza Gums, CRNP Last Rate: 125 mL/hr (02/05/18 0811)     Continuous Infusions:   sodium chloride 125 mL/hr Last Rate: 125 mL/hr (02/05/18 0811)     PRN Meds: labetalol    ondansetron     ICU  NPO  Na  Level  Q 4 hrs  IV  zofran PRN ( x1  2/4  And  X 1  2/5  Thus far)    Progress  Note  2/5  1  Severe hyponatremia  · Patient has had a slow rise in her sodium level  We will continue her on normal saline for now with our goal to maintain a increase of 8-10 mEq in a 24 hour time frame  · We will continue to monitor her sodium level every 4 hours  2  Community-acquired pneumonia  · We will continue her on ceftriaxone and Zithromax for now pending her culture results  3  Nausea and vomiting, questionable gastroenteritis  · The patient continues to have nausea which may in part be related to an contributing to her severe hyponatremia  There is a possibility that the patient has a coexisting gastroenteritis  We will continue with supportive care for now  4  Diabetes mellitus type 2  · We will continue the patient on sliding scale insulin with a goal to maintain the blood glucose between 140 and 180  5  Encephalopathy, likely related to 1  Thank you,  [facility] in the [facility] by Darvin Trinidad for 2017  Network Utilization Review Department  Phone: [phone];  Fax [phone]  ATTENTION: The Network Utilization Review Department is now centralized for our 7 Facilities  Make a note that we have a new phone and fax numbers for our Department  Please call with any questions or concerns to [phone] and carefully follow the prompts so that you are directed to the right person  All voicemails are confidential  Fax any determinations, approvals, denials, and requests for initial or continue stay review clinical to [phone]  Due to HIGH CALL volume, it would be easier if you could please send faxed requests to expedite your requests and in part, help us provide discharge notifications faster

## 2018-02-06 PROBLEM — G93.41 ACUTE METABOLIC ENCEPHALOPATHY: Status: ACTIVE | Noted: 2018-02-06

## 2018-02-06 PROBLEM — I25.10 CAD (CORONARY ARTERY DISEASE): Status: ACTIVE | Noted: 2018-02-06

## 2018-02-06 PROBLEM — E78.5 HYPERLIPIDEMIA: Status: ACTIVE | Noted: 2018-02-06

## 2018-02-06 PROBLEM — G93.41 ACUTE METABOLIC ENCEPHALOPATHY: Status: RESOLVED | Noted: 2018-02-06 | Resolved: 2018-02-06

## 2018-02-06 PROBLEM — I10 HTN (HYPERTENSION), BENIGN: Status: ACTIVE | Noted: 2018-02-06

## 2018-02-06 PROBLEM — F41.1 GAD (GENERALIZED ANXIETY DISORDER): Status: ACTIVE | Noted: 2018-02-06

## 2018-02-06 PROBLEM — R11.2 NAUSEA & VOMITING: Status: ACTIVE | Noted: 2018-02-06

## 2018-02-06 LAB
ANION GAP SERPL CALCULATED.3IONS-SCNC: 7 MMOL/L (ref 4–13)
BUN SERPL-MCNC: 5 MG/DL (ref 5–25)
CALCIUM SERPL-MCNC: 8.1 MG/DL (ref 8.3–10.1)
CHLORIDE SERPL-SCNC: 97 MMOL/L (ref 100–108)
CO2 SERPL-SCNC: 27 MMOL/L (ref 21–32)
CREAT SERPL-MCNC: 0.65 MG/DL (ref 0.6–1.3)
GFR SERPL CREATININE-BSD FRML MDRD: 81 ML/MIN/1.73SQ M
GLUCOSE SERPL-MCNC: 117 MG/DL (ref 65–140)
GLUCOSE SERPL-MCNC: 137 MG/DL (ref 65–140)
GLUCOSE SERPL-MCNC: 152 MG/DL (ref 65–140)
GLUCOSE SERPL-MCNC: 163 MG/DL (ref 65–140)
OSMOLALITY UR/SERPL-RTO: 275 MMOL/KG (ref 282–298)
OSMOLALITY UR: 173 MMOL/KG
POTASSIUM SERPL-SCNC: 3.4 MMOL/L (ref 3.5–5.3)
SODIUM SERPL-SCNC: 128 MMOL/L (ref 136–145)
SODIUM SERPL-SCNC: 128 MMOL/L (ref 136–145)
SODIUM SERPL-SCNC: 130 MMOL/L (ref 136–145)
SODIUM SERPL-SCNC: 130 MMOL/L (ref 136–145)
SODIUM SERPL-SCNC: 131 MMOL/L (ref 136–145)
SODIUM SERPL-SCNC: 131 MMOL/L (ref 136–145)

## 2018-02-06 PROCEDURE — 84295 ASSAY OF SERUM SODIUM: CPT | Performed by: INTERNAL MEDICINE

## 2018-02-06 PROCEDURE — 83930 ASSAY OF BLOOD OSMOLALITY: CPT | Performed by: INTERNAL MEDICINE

## 2018-02-06 PROCEDURE — 83935 ASSAY OF URINE OSMOLALITY: CPT | Performed by: INTERNAL MEDICINE

## 2018-02-06 PROCEDURE — 92610 EVALUATE SWALLOWING FUNCTION: CPT

## 2018-02-06 PROCEDURE — 99233 SBSQ HOSP IP/OBS HIGH 50: CPT | Performed by: INTERNAL MEDICINE

## 2018-02-06 PROCEDURE — 82948 REAGENT STRIP/BLOOD GLUCOSE: CPT

## 2018-02-06 PROCEDURE — 80048 BASIC METABOLIC PNL TOTAL CA: CPT | Performed by: NURSE PRACTITIONER

## 2018-02-06 RX ORDER — ALPRAZOLAM 0.25 MG/1
0.25 TABLET ORAL
Status: DISCONTINUED | OUTPATIENT
Start: 2018-02-06 | End: 2018-02-08 | Stop reason: HOSPADM

## 2018-02-06 RX ORDER — TELMISARTAN 80 MG/1
80 TABLET ORAL DAILY
COMMUNITY
Start: 2017-09-13 | End: 2018-02-08 | Stop reason: HOSPADM

## 2018-02-06 RX ORDER — FAMOTIDINE 20 MG/1
20 TABLET, FILM COATED ORAL DAILY
Status: DISCONTINUED | OUTPATIENT
Start: 2018-02-06 | End: 2018-02-07

## 2018-02-06 RX ORDER — AMLODIPINE BESYLATE 10 MG/1
10 TABLET ORAL DAILY
COMMUNITY
Start: 2017-09-13

## 2018-02-06 RX ORDER — ALBUTEROL SULFATE 90 UG/1
2 AEROSOL, METERED RESPIRATORY (INHALATION) EVERY 4 HOURS PRN
Status: DISCONTINUED | OUTPATIENT
Start: 2018-02-06 | End: 2018-02-08 | Stop reason: HOSPADM

## 2018-02-06 RX ORDER — AMLODIPINE BESYLATE 10 MG/1
10 TABLET ORAL DAILY
Status: DISCONTINUED | OUTPATIENT
Start: 2018-02-06 | End: 2018-02-08 | Stop reason: HOSPADM

## 2018-02-06 RX ORDER — PRAVASTATIN SODIUM 20 MG
20 TABLET ORAL
Status: DISCONTINUED | OUTPATIENT
Start: 2018-02-06 | End: 2018-02-08 | Stop reason: HOSPADM

## 2018-02-06 RX ORDER — FAMOTIDINE 20 MG/1
20 TABLET, FILM COATED ORAL DAILY
COMMUNITY
Start: 2018-01-31 | End: 2018-11-02

## 2018-02-06 RX ORDER — ASPIRIN 81 MG/1
81 TABLET, CHEWABLE ORAL DAILY
Status: DISCONTINUED | OUTPATIENT
Start: 2018-02-06 | End: 2018-02-07

## 2018-02-06 RX ORDER — ALBUTEROL SULFATE 90 UG/1
2 AEROSOL, METERED RESPIRATORY (INHALATION) EVERY 6 HOURS
COMMUNITY
Start: 2018-01-31 | End: 2018-12-16 | Stop reason: ALTCHOICE

## 2018-02-06 RX ORDER — ONDANSETRON 2 MG/ML
4 INJECTION INTRAMUSCULAR; INTRAVENOUS EVERY 8 HOURS PRN
Status: DISCONTINUED | OUTPATIENT
Start: 2018-02-06 | End: 2018-02-08 | Stop reason: HOSPADM

## 2018-02-06 RX ORDER — METOPROLOL TARTRATE 100 MG/1
100 TABLET ORAL EVERY 12 HOURS SCHEDULED
Status: DISCONTINUED | OUTPATIENT
Start: 2018-02-06 | End: 2018-02-08 | Stop reason: HOSPADM

## 2018-02-06 RX ORDER — CEFDINIR 300 MG/1
300 CAPSULE ORAL EVERY 12 HOURS SCHEDULED
Status: DISCONTINUED | OUTPATIENT
Start: 2018-02-06 | End: 2018-02-08 | Stop reason: HOSPADM

## 2018-02-06 RX ORDER — SIMVASTATIN 10 MG
10 TABLET ORAL
COMMUNITY
Start: 2017-09-13

## 2018-02-06 RX ORDER — ALPRAZOLAM 0.25 MG/1
0.25 TABLET ORAL 2 TIMES DAILY
COMMUNITY
Start: 2018-02-02

## 2018-02-06 RX ORDER — METOPROLOL TARTRATE 100 MG/1
100 TABLET ORAL 2 TIMES DAILY
COMMUNITY
Start: 2017-09-13

## 2018-02-06 RX ADMIN — CEFDINIR 300 MG: 300 CAPSULE ORAL at 11:15

## 2018-02-06 RX ADMIN — AMLODIPINE BESYLATE 10 MG: 10 TABLET ORAL at 11:14

## 2018-02-06 RX ADMIN — METOPROLOL TARTRATE 100 MG: 100 TABLET ORAL at 11:14

## 2018-02-06 RX ADMIN — FAMOTIDINE 20 MG: 20 TABLET, FILM COATED ORAL at 11:14

## 2018-02-06 RX ADMIN — PRAVASTATIN SODIUM 20 MG: 40 TABLET ORAL at 16:56

## 2018-02-06 RX ADMIN — SERTRALINE HYDROCHLORIDE 50 MG: 50 TABLET ORAL at 11:14

## 2018-02-06 RX ADMIN — ALPRAZOLAM 0.25 MG: 0.25 TABLET ORAL at 16:55

## 2018-02-06 RX ADMIN — HEPARIN SODIUM 5000 UNITS: 5000 INJECTION, SOLUTION INTRAVENOUS; SUBCUTANEOUS at 06:10

## 2018-02-06 RX ADMIN — DEXTROSE 125 ML/HR: 5 SOLUTION INTRAVENOUS at 06:34

## 2018-02-06 RX ADMIN — HEPARIN SODIUM 5000 UNITS: 5000 INJECTION, SOLUTION INTRAVENOUS; SUBCUTANEOUS at 21:06

## 2018-02-06 RX ADMIN — ASPIRIN 81 MG 81 MG: 81 TABLET ORAL at 11:14

## 2018-02-06 RX ADMIN — CEFDINIR 300 MG: 300 CAPSULE ORAL at 21:06

## 2018-02-06 NOTE — PLAN OF CARE
Problem: SLP ADULT - SWALLOWING, IMPAIRED  Goal: Initial SLP swallow eval performed  Outcome: Completed Date Met: 02/06/18

## 2018-02-06 NOTE — PROGRESS NOTES
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  Her serum osm was low    -patient was started on normal saline  Her sodium has gradually corrected to her baseline  It is 131 today  Will hold IV fluids  Will monitor her over night  If her sodium remains stable off the IV fluid support, she may be discharged home    -patient has a history of chronic hyponatremia  She will be referred back to her primary care physician at [facility]   Will not institute a new workup for her chronic hyponatremia at this time  9-ywrbropma-bimnsnap pneumonia:  Patient was noted to have a right lower lobe pneumonia on admission  On her prior admissions to [facility] earlier this week she was noted to have infiltrate involving both bases  Patient had not been compliant with her Levaquin dose    -patient's case was discussed with the pulmonologist   On her current imaging she has a small infiltrate at the right base  This is likely related to her previously diagnosed pneumonia rather than a new pneumonia  As patient had possible GI upset related to her levofloxacin, her antibiotics were changed to ceftriaxone  Patient had presented with nausea and vomiting  It is possible that this infiltrate is related to aspiration pneumonia/pneumonitis  However as she is clinically improving the pulmonary service does not recommend any anaerobic coverage such as Flagyl to her regimen      -patient is currently afebrile  She denies any shortness of breath or cough  3-dysphagia: There were concerns that patient was exhibiting signs of dysphagia  In light of her right lower lobe infiltrate which could be representative of aspiration, patient will undergo a speech therapy evaluation    Patient currently denies any signs or symptoms of aspiration  4-acute metabolic encephalopathy:  Patient reportedly had confusion upon presentation  This was felt to be secondary to acute toxic/metabolic encephalopathy secondary to her significant hyponatremia  Her sodium was 112  This has improved  She is currently awake, alert, and oriented  5-nausea/vomiting:  Patient presented with nausea and vomiting  Review of her [facility] charts, it is notable that she has frequent admissions and ER visits and nausea and vomiting are present during those visits  I reviewed her family physicians notes, any relates the patient has significant anxiety, which manifests frequently as nausea  This had improved when she was placed on higher doses of Xanax in addition to her Zoloft  Patient's niece has also noted the correlation of nausea with patient's episodes of anxiety    -discussed with them the option of a GI workup, however that is declined at this time  Patient currently denies any nausea, vomiting, abdominal discomfort  She is tolerating p o  without any difficulty  -continue home famotidine    -will restart her home Xanax    -her back to her primary care provider for follow-up    6-diabetes type 2:  On diet therapy as an outpatient  7-essential hypertension:  Reviewed patient's discharge summary from [facility]   Will restart her home medications which consist of Micardis 80 mg, metoprolol 100 mg b i d , Norvasc 10 mg daily     8-1 2 cm cystic lesion in the pancreatic head:  Incidental finding  Will for back to primary care provider for further follow-up    9-ambulatory dysfunction/generalized weakness:  Noted on admission  Secondary to her hyponatremia  Has markedly improved  Await physical therapy/occupational therapy evaluation    10-coronary artery disease: Without any evidence of acute ischemia  Continue aspirin, beta-blocker, and statin      11-generalized anxiety disorder: Restart home Xanax  Patient's dose was recently increased and she relates that she takes it scheduled 0 25 mg t i d  12-hyperlipidemia:  Continue statin    13-family:  Updated niece Austen Gardner via phone  Reviewed test results and tx plan  VTE Pharmacologic Prophylaxis: Heparin  VTE Mechanical Prophylaxis: sequential compression device    Certification Statement: The patient will continue to require additional inpatient hospital stay due to need for further acute intervention for hyponatremia  Status: inpatient     Total time spent including her [facility] chart, current records and chart  Discussion with Dr Sumanth De Leon, as well as patient's interview, exam, and discussion with her nurse:  60 minutes    ===================================================================    Subjective:  Patient is examined and interviewed  Her chart and records are reviewed  Her [facility] records as well as previous hospitalizations and January and February of 2018 were reviewed  Review of [facility] chart patient was admitted February 1st to February 2nd with acute/chronic hyponatremia and nausea  Her nausea began after she took Levaquin and so it was attributed to the antibiotics  Her nausea was also felt to have been exacerbated by her anxiety  She has a history of previous palpitations attributed to anxiety that were treated with Xanax  Patient was also admitted January 28 through the 31st for hyponatremia and pneumonia  She was treated with levofloxacin  She was diagnosed with SIADH  She had nausea at that time that was attributed to her anxiety  Patient was also diagnosed with influenza A on January 21st     Patient's niece notes that patient had skip her dose of Levaquin as she thought it was upsetting her stomach  Patient denies any current nausea or vomiting  She denies any GI upset  She denies any abdominal pain or cramping  She denies any diarrhea    She is tolerating p o  without any difficulties  She denies any shortness of breath  She denies any current cough  She denies any chest congestion or phlegm  She denies any dizziness or lightheadedness  She denies any weakness  Patient relates she feels at her baseline and strenuously requesting discharge home  Patient notes she has a history of anxiety and is requesting her Xanax  She relates that she takes that 3 times a day scheduled at home 1st thing in the morning, at 3:00 p m , and at bedtime  She denies any pain anywhere at all  She denies any headache, chest pain, back pain, abdominal pain  Physical Exam:   Temp:  [98 5 °F (36 9 °C)-99 3 °F (37 4 °C)] 99 3 °F (37 4 °C)  HR:  [] 90  Resp:  [19-44] 22  BP: (122-190)/(59-91) 162/77    Gen:  Pleasant, non-tachypnic, non-dyspnic  Conversant  Sitting up in a chair  Smiling  Communicative  Heart: regular rate and rhythm, S1S2 present, no murmur, rub or gallop  Lungs: clear to ausculatation bilaterally  No wheezing, crackles, or rhonchi  No accessory muscle use or respiratory distress  Abd: soft, non-tender, non-distended  NABS, no guarding, rebound or peritoneal signs  Extremities: no clubbing, cyanosis or edema  2+pedal pulses bilaterally  Full range of motion  Neuro: awake, alert and oriented  Cranial nerves 2-12 intact  Strength and sensation grossly intact  Skin: warm and dry: no petechiae, purpura and rash      LABS:     Results from last 7 days  Lab Units 02/05/18 0527 02/04/18  1836   WBC Thousand/uL 14 78* 10 78*   HEMOGLOBIN g/dL 10 3* 10 5*   HEMATOCRIT % 29 0* 30 6*   PLATELETS Thousands/uL 361 351       Results from last 7 days  Lab Units 02/06/18  0613 02/06/18  0236 02/05/18  2231  02/05/18  0527 02/05/18  0125   SODIUM mmol/L 131*  131* 130* 126*  < > 117* 114*   POTASSIUM mmol/L 3 4*  --   --   --  3 9 3 8   CHLORIDE mmol/L 97*  --   --   --  85* 83*   CO2 mmol/L 27  --   --   --  20* 20*   BUN mg/dL 5  --   --   -- 10 11   CREATININE mg/dL 0 65  --   --   --  0 66 0 67   GLUCOSE RANDOM mg/dL 163*  --   --   --  149* 211*   CALCIUM mg/dL 8 1*  --   --   --  8 1* 7 7*   < > = values in this interval not displayed  Hospital Data:      Urinalysis:  Negative nitrates/leukocyte esterase  Negative Legionella/strep pneumoniae antigen      ---------------------------------------------------------------------------------------------------------------  This note has been constructed using a voice recognition system

## 2018-02-06 NOTE — SPEECH THERAPY NOTE
Speech Language/Pathology  Speech/Language Pathology  Assessment    Patient Name: Mario Telles  [id number]'J Date: 2/6/2018     Problem List  Patient Active Problem List   Diagnosis    Pneumonia due to infectious organism    Hyponatremia    Type 2 diabetes mellitus ([facility] )    Nausea & vomiting    CAD (coronary artery disease)    ALEJANDRA (generalized anxiety disorder)    Hyperlipidemia    HTN (hypertension), benign     Past Medical History  Past Medical History:   Diagnosis Date    DM (diabetes mellitus) ([facility]     Hypertension      Past Surgical History  Past Surgical History:   Procedure Laterality Date    NO PAST SURGERIES         ER note:  17-year-old female presents for evaluation of vomiting  The patient presents from home after several episodes of vomiting  The patient was recently seen at [facility] for similar type symptoms and was diagnosed with pneumonia put on Levaquin  Per EMS, the patient has had significant decline since the last transport to the emergency department  She is normally ambulatory and independent at home  The patient was unable to get up and had to be assisted onto the stretcher today  The patient is confused and only answers very basic questions  2/5 pulmonary:  Attestation signed by Unique Stapleton MD at 2/5/2018 9:53 AM (Updated)   Split/Shared Statement:  I have seen and examined the patient  I have discussed the patient's care with the advanced practitioner  I personally reviewed the laboratory data  Imaging Studies were reviewed personally on the [facility] system:  Chest x-ray is clear  Abdominal CT scan shows a minor focus at the right base of pneumonia  EXAMINATION:  Alert and interactive  She appears comfortable  Mucous membranes are dry  She is complaining of nausea  Lungs are clear  Heart is regular  Abdomen is soft and nontender  Extremities without any edema      I agree with the findings, assessment, and plan as outlined in the note by Sonja Santoyo Jodee  Continue cautious hydration for hyponatremia  The degree of pneumonia is quite minimal   There is no cough or phlegm production  Continue current treatment for now  No evidence of gastroenteritis by CT scan  Unclear cause her nausea and vomiting at this point  Documentation clarification:  Encephalopathy is acute metabolic secondary to hyponatremia  Given the nausea and vomiting this is likely a small focus of aspiration pneumonia of the right lower lobe continue ceftriaxone, discontinue azithromycin  Would treat for 7 days total            Reason for consult:  R/o aspiration  Determine safest and least restrictive diet  Current diet:  Dysphagia 2 w/ nectar thick  Premorbid diet[de-identified]  regular  Previous VBS:  none  O2 requirement:  none  Voice/Speech:  wnl  Social:  Lives alone  Follows commands:  yes                    Cognitive Status:  A&O  Oral mech exam:  Full dentition    Items administered:  Chicken breast sandwich, javier slaw, thin liquids, meds whole w/ thin liquids  Oral stage:  WNL  Lip closure:+  Mastication:+  Bolus formation:+  Bolus control:+  Transfer:+  Oral residue:-  Pocketing:-    Pharyngeal stage: WNL  Swallow promptness:+  Laryngeal rise:+  Wet voice:-  Throat clear:-  Cough:-  Secondary swallows:-  Audible swallows:-  No s/s aspiration    Esophageal stage:  No s/s reported    Summary:  Pt presents w/ no s/s oral or pharyngeal dysphagia  Good intake  No s/s aspiration  Recommendations:  Diet:regular  Liquid:thin  Meds:whole w/ thin  Supervision: not needed at this time  Positioning:Upright    Results d/w:  Pt, nursing, dietician (student)    Eval only  D/c

## 2018-02-07 LAB
ANION GAP SERPL CALCULATED.3IONS-SCNC: 7 MMOL/L (ref 4–13)
BUN SERPL-MCNC: 6 MG/DL (ref 5–25)
CALCIUM SERPL-MCNC: 8.2 MG/DL (ref 8.3–10.1)
CHLORIDE SERPL-SCNC: 98 MMOL/L (ref 100–108)
CO2 SERPL-SCNC: 27 MMOL/L (ref 21–32)
CREAT SERPL-MCNC: 0.62 MG/DL (ref 0.6–1.3)
ERYTHROCYTE [DISTWIDTH] IN BLOOD BY AUTOMATED COUNT: 13.8 % (ref 11.6–15.1)
GFR SERPL CREATININE-BSD FRML MDRD: 82 ML/MIN/1.73SQ M
GLUCOSE SERPL-MCNC: 115 MG/DL (ref 65–140)
GLUCOSE SERPL-MCNC: 127 MG/DL (ref 65–140)
GLUCOSE SERPL-MCNC: 128 MG/DL (ref 65–140)
GLUCOSE SERPL-MCNC: 137 MG/DL (ref 65–140)
GLUCOSE SERPL-MCNC: 200 MG/DL (ref 65–140)
HCT VFR BLD AUTO: 28.8 % (ref 34.8–46.1)
HGB BLD-MCNC: 9.3 G/DL (ref 11.5–15.4)
MCH RBC QN AUTO: 29.8 PG (ref 26.8–34.3)
MCHC RBC AUTO-ENTMCNC: 32.3 G/DL (ref 31.4–37.4)
MCV RBC AUTO: 92 FL (ref 82–98)
OSMOLALITY UR/SERPL-RTO: 276 MMOL/KG (ref 282–298)
PLATELET # BLD AUTO: 276 THOUSANDS/UL (ref 149–390)
PMV BLD AUTO: 9.3 FL (ref 8.9–12.7)
POTASSIUM SERPL-SCNC: 3.5 MMOL/L (ref 3.5–5.3)
RBC # BLD AUTO: 3.12 MILLION/UL (ref 3.81–5.12)
SODIUM SERPL-SCNC: 130 MMOL/L (ref 136–145)
SODIUM SERPL-SCNC: 132 MMOL/L (ref 136–145)
WBC # BLD AUTO: 6.77 THOUSAND/UL (ref 4.31–10.16)

## 2018-02-07 PROCEDURE — 82948 REAGENT STRIP/BLOOD GLUCOSE: CPT

## 2018-02-07 PROCEDURE — 97166 OT EVAL MOD COMPLEX 45 MIN: CPT

## 2018-02-07 PROCEDURE — 97163 PT EVAL HIGH COMPLEX 45 MIN: CPT

## 2018-02-07 PROCEDURE — G8988 SELF CARE GOAL STATUS: HCPCS

## 2018-02-07 PROCEDURE — 84295 ASSAY OF SERUM SODIUM: CPT | Performed by: INTERNAL MEDICINE

## 2018-02-07 PROCEDURE — 99232 SBSQ HOSP IP/OBS MODERATE 35: CPT | Performed by: INTERNAL MEDICINE

## 2018-02-07 PROCEDURE — 80048 BASIC METABOLIC PNL TOTAL CA: CPT | Performed by: INTERNAL MEDICINE

## 2018-02-07 PROCEDURE — G8987 SELF CARE CURRENT STATUS: HCPCS

## 2018-02-07 PROCEDURE — G8979 MOBILITY GOAL STATUS: HCPCS

## 2018-02-07 PROCEDURE — 83930 ASSAY OF BLOOD OSMOLALITY: CPT | Performed by: INTERNAL MEDICINE

## 2018-02-07 PROCEDURE — G8978 MOBILITY CURRENT STATUS: HCPCS

## 2018-02-07 PROCEDURE — 85027 COMPLETE CBC AUTOMATED: CPT | Performed by: INTERNAL MEDICINE

## 2018-02-07 RX ORDER — POLYETHYLENE GLYCOL 3350 17 G/17G
17 POWDER, FOR SOLUTION ORAL DAILY
Status: DISCONTINUED | OUTPATIENT
Start: 2018-02-07 | End: 2018-02-08

## 2018-02-07 RX ORDER — ASPIRIN 81 MG/1
81 TABLET ORAL DAILY
Status: DISCONTINUED | OUTPATIENT
Start: 2018-02-08 | End: 2018-02-08 | Stop reason: HOSPADM

## 2018-02-07 RX ORDER — PANTOPRAZOLE SODIUM 40 MG/1
40 TABLET, DELAYED RELEASE ORAL
Status: DISCONTINUED | OUTPATIENT
Start: 2018-02-07 | End: 2018-02-08 | Stop reason: HOSPADM

## 2018-02-07 RX ORDER — DOCUSATE SODIUM 100 MG/1
100 CAPSULE, LIQUID FILLED ORAL 2 TIMES DAILY
Status: DISCONTINUED | OUTPATIENT
Start: 2018-02-07 | End: 2018-02-08

## 2018-02-07 RX ADMIN — METOPROLOL TARTRATE 100 MG: 100 TABLET ORAL at 08:59

## 2018-02-07 RX ADMIN — ALPRAZOLAM 0.25 MG: 0.25 TABLET ORAL at 06:31

## 2018-02-07 RX ADMIN — AMLODIPINE BESYLATE 10 MG: 10 TABLET ORAL at 08:59

## 2018-02-07 RX ADMIN — CEFDINIR 300 MG: 300 CAPSULE ORAL at 21:52

## 2018-02-07 RX ADMIN — DOCUSATE SODIUM 100 MG: 100 CAPSULE, LIQUID FILLED ORAL at 17:04

## 2018-02-07 RX ADMIN — SERTRALINE HYDROCHLORIDE 50 MG: 50 TABLET ORAL at 08:59

## 2018-02-07 RX ADMIN — DOCUSATE SODIUM 100 MG: 100 CAPSULE, LIQUID FILLED ORAL at 13:12

## 2018-02-07 RX ADMIN — ALPRAZOLAM 0.25 MG: 0.25 TABLET ORAL at 21:52

## 2018-02-07 RX ADMIN — HEPARIN SODIUM 5000 UNITS: 5000 INJECTION, SOLUTION INTRAVENOUS; SUBCUTANEOUS at 13:12

## 2018-02-07 RX ADMIN — PRAVASTATIN SODIUM 20 MG: 40 TABLET ORAL at 17:04

## 2018-02-07 RX ADMIN — HEPARIN SODIUM 5000 UNITS: 5000 INJECTION, SOLUTION INTRAVENOUS; SUBCUTANEOUS at 06:30

## 2018-02-07 RX ADMIN — ASPIRIN 81 MG 81 MG: 81 TABLET ORAL at 08:59

## 2018-02-07 RX ADMIN — CEFDINIR 300 MG: 300 CAPSULE ORAL at 11:48

## 2018-02-07 RX ADMIN — HEPARIN SODIUM 5000 UNITS: 5000 INJECTION, SOLUTION INTRAVENOUS; SUBCUTANEOUS at 21:52

## 2018-02-07 RX ADMIN — FAMOTIDINE 20 MG: 20 TABLET, FILM COATED ORAL at 08:58

## 2018-02-07 RX ADMIN — POLYETHYLENE GLYCOL 3350 17 G: 17 POWDER, FOR SOLUTION ORAL at 13:12

## 2018-02-07 RX ADMIN — METOPROLOL TARTRATE 100 MG: 100 TABLET ORAL at 21:52

## 2018-02-07 RX ADMIN — ALPRAZOLAM 0.25 MG: 0.25 TABLET ORAL at 17:04

## 2018-02-07 NOTE — PHYSICAL THERAPY NOTE
PT EVALUATION    80 y o     [id number]    Acute hyponatremia [E87 1]  Nausea [R11 0]  Vomiting [R11 10]  Right lower lobe pneumonia (HCC) [J18 1]    Past Medical History:   Diagnosis Date    DM (diabetes mellitus) ([facility] [address] )     Hypertension          Past Surgical History:   Procedure Laterality Date    NO PAST SURGERIES        02/07/18 1230   Note Type   Note type Eval only   Pain Assessment   Pain Score No Pain   Home Living   Type of [address] Two level;1/2 bath on main level;Stairs to enter with rails  (6 ALBAN)   Bathroom Shower/Tub Tub/shower unit   Bathroom Toilet Standard   Bathroom Equipment Grab bars in shower   [facility]  (not using PTA)   Additional Comments States keeps RW handy if she would need it, but reports usually does not use  Prior Function   Level of [place] Independent with ADLs and functional mobility   Lives With Alone   Receives Help From Family  (niece Radha Mancuso)   ADL Assistance Independent   IADLs Independent   Falls in the last 6 months 0   Comments I without AD  reports driving PTA  Restrictions/Precautions   Weight Bearing Precautions Per Order No   Other Precautions Fall Risk;Hard of hearing   General   Additional Pertinent History Pt is 81 y/o female who was recently d/c from [facility] p flu, admitted with hyponatremia and pnuemonia  PT consulted  Family/Caregiver Present No   Cognition   Overall Cognitive Status WFL   Comments [place]   RUE Assessment   RUE Assessment WFL   LUE Assessment   LUE Assessment WFL   RLE Assessment   RLE Assessment WFL  (grossly 4-/5)   LLE Assessment   LLE Assessment WFL  (grossly 4-/5)   Bed Mobility   Additional Comments OOB in chair upon arrival    Transfers   Sit to Stand 5  Supervision   Additional items Increased time required;Verbal cues;Armrests   Stand to Sit 5  Supervision   Additional items Assist x 1; Armrests; Increased time required   Additional Comments cues for hand placement Ambulation/Elevation   Gait pattern Decreased foot clearance; Inconsistent shin; Short stride  (exaggerated arm swing )   Gait Assistance 5  Supervision   Additional items Verbal cues   Assistive Device (none)   Distance Amb 35'x1   Balance   Static Sitting Good   Dynamic Sitting Fair +   Static Standing Fair   Dynamic Standing Fair -   Ambulatory Fair -   Endurance Deficit   Endurance Deficit Yes   Endurance Deficit Description fatigue   Activity Tolerance   Activity Tolerance Patient limited by fatigue;Patient tolerated treatment well   Medical Staff Made Aware Nurse, [address] ok for PT to see   Assessment   Prognosis Good   Problem List Decreased strength;Decreased endurance; Impaired balance;Decreased mobility   Assessment Pt is 79 y/o female admitted wtih hyponatremia and pnuemonia  Recently d/c from [facility] p flu  Lives alone and baseline reports independent without use of AD, although has a walker  Currently with decrese in strength, activity tolerance, balance and mobility  S for trnasfers  Close S for ambulation  Gait with slowed pace, exaggerated arm swing and inconsistent pace  Will benefit from PT in order to optimize independent function and improve activity tolerance  May benefit from STR as lives alone, however pt does not feel need and goal to return home  Only support named as [facility]  Barriers to Discharge Decreased caregiver support; Inaccessible home environment   Goals   Patient Goals go to the bathroom and will feel better  STG Expiration Date 02/14/18   Short Term Goal #1 7 days:  I for bed mobility  I for transfers  Amb with LRAD prn > 200'x1 wtih [place]  Negotiate steps wtih S  Improve overall strength and balance 1/2 grade  Increase activity tolerance to 45 minutes  Treatment Day 0   Plan   Treatment/Interventions Functional transfer training;LE strengthening/ROM; Elevations; Therapeutic exercise; Endurance training;Patient/family training;Equipment eval/education; Bed mobility;Gait training; Compensatory technique education;Continued evaluation;Spoke to nursing;OT   PT Frequency 5x/wk   Recommendation   Recommendation Short-term skilled PT; Home PT  (may benefit from STR at this time, pt desires hhpt)   Additional Comments If to home, must acheive IPPT goals       Barthel Index   Feeding 10   Bathing 0   Grooming Score 5   Dressing Score 10   Bladder Score 10   Bowels Score 10   Toilet Use Score 5   Transfers (Bed/Chair) Score 10   Mobility (Level Surface) Score 0   Stairs Score 0   Barthel Index Score 60   History: co - morbidities, fall risk, lives alone, steps, limited social support, recent d/c from hospital p flu  Exam: impairments in locomotion, musculoskeletal, balance, activity tolerance, barthel 60  Clinical: unpredictable ( fall risk, decreased activity tolerance)  Complexity: high    Troy Day, PT

## 2018-02-07 NOTE — PLAN OF CARE
Problem: OCCUPATIONAL THERAPY ADULT  Goal: Performs self-care activities at highest level of function for planned discharge setting  See evaluation for individualized goals  Treatment Interventions: ADL retraining, Functional transfer training, UE strengthening/ROM, Endurance training, Patient/family training, Compensatory technique education, Energy conservation, Activityengagement, Equipment evaluation/education, Cognitive reorientation          See flowsheet documentation for full assessment, interventions and recommendations  Limitation: Decreased ADL status, Decreased UE strength, Decreased cognition, Decreased endurance, Decreased Safe judgement during ADL, Decreased self-care trans, Decreased high-level ADLs  Prognosis: Good  Assessment: Pt is a 80 y o  female seen for OT evaluation s/p admit to [facility] on 2/4/2018 w/ hyponatremia, Pneumonia due to infectious organism  Comorbidities affecting pt's functional performance at time of assessment include: recent diagnosis of flu at [facility] in January, dysphagia, acute metabolic encephalopathy, anxiety, HTN, h/o THR  Personal factors affecting pt at time of IE include: lives alone, hard of hearing  Prior to admission, pt was living alone in [facility] and pt reports indepenent w/ ADLs, independent IADLs, independent functional transfers and mobility w/ no AD, driving  Upon evaluation: Pt requires supervision sit<>Stand transfers, supervision functional transfers and mobility w/ no AD, MIN assist LB ADLS, MIN assist toileting 2* the following deficits impacting occupational performance: increased fatigue, questionable STM, impaired insight, impaired balance, impaired activity tolerance, impaired insight  Pt to benefit from continued skilled OT tx while in the hospital to address deficits as defined above and maximize level of functional independence w ADL's and functional mobility   Occupational Performance areas to address include: grooming, bathing/shower, toilet hygiene, dressing, medication management, functional mobility, clothing management, cleaning and meal prep, formal cognitive evaluation, home safety education  From OT standpoint, recommendation at time of d/c would be short term rehab vs  HOME w/ OT and family support        OT Discharge Recommendation:  (STR vs  HOME OT)         Comments: Daniel Murphy MS, OTR/L

## 2018-02-07 NOTE — PLAN OF CARE
Problem: PHYSICAL THERAPY ADULT  Goal: Performs mobility at highest level of function for planned discharge setting  See evaluation for individualized goals  Treatment/Interventions: Functional transfer training, LE strengthening/ROM, Elevations, Therapeutic exercise, Endurance training, Patient/family training, Equipment eval/education, Bed mobility, Gait training, Compensatory technique education, Continued evaluation, Spoke to nursing, OT          See flowsheet documentation for full assessment, interventions and recommendations  Prognosis: Good  Problem List: Decreased strength, Decreased endurance, Impaired balance, Decreased mobility  Assessment: Pt is 79 y/o female admitted wtih hyponatremia and pnuemonia  Recently d/c from [facility]  Lives alone and baseline reports independent without use of AD, although has a walker  Currently with decrese in strength, activity tolerance, balance and mobility  S for trnasfers  Close S for ambulation  Gait with slowed pace, exaggerated arm swing and inconsistent pace  Will benefit from PT in order to optimize independent function and improve activity tolerance  May benefit from STR as lives alone, however pt does not feel need and goal to return home  Only support named as chanelle Abreu  Barriers to Discharge: Decreased caregiver support, Inaccessible home environment     Recommendation: Short-term skilled PT, Home PT (may benefit from STR at this time, pt desires hhpt)          See flowsheet documentation for full assessment

## 2018-02-07 NOTE — OCCUPATIONAL THERAPY NOTE
[address] Evaluation     Patient Name: [facility]  [id number] Date: 2/7/2018  Problem List  Patient Active Problem List   Diagnosis    Pneumonia due to infectious organism    Hyponatremia    Type 2 diabetes mellitus ([facility] [address] )    Nausea & vomiting    CAD (coronary artery disease)    ALEJANDRA (generalized anxiety disorder)    Hyperlipidemia    HTN (hypertension), benign     Past Medical History  Past Medical History:   Diagnosis Date    DM (diabetes mellitus) ([facility] [address] )     Hypertension      Past Surgical History  Past Surgical History:   Procedure Laterality Date    NO PAST SURGERIES             02/07/18 1228   Note Type   Note type Eval/Treat   Restrictions/Precautions   Weight Bearing Precautions Per Order No   Other Precautions Fall Risk;Hard of hearing   Pain Assessment   Pain Assessment No/denies pain   Pain Score No Pain   Home Living   Type of [address] [place] [address] Two level;Stairs to enter with rails;Bed/bath upstairs  (6 ALBAN)   Bathroom Shower/Tub Tub/shower unit   Bathroom Toilet Standard   Bathroom Equipment Grab bars in [facility]   Additional Comments pt reports she keeps RW next to bed if she needs it   Prior Function   Level of [place] Independent with ADLs and functional mobility   Lives With Alone   Receives Help From Family  (chanelle Driscoll)   ADL Assistance Independent   IADLs Independent   Falls in the last 6 months 0   Vocational Retired   Comments pt driving PTA   Lifestyle   Autonomy per pt independent w/ ADLs, independent IADLs, independent functional transfers and mobility w/ no AD, driving   Reciprocal Relationships chanelle Driscoll   Service to Others retired   Intrinsic Gratification keeping active and walking at [facility] 5  [address] Dressing Assistance 5  Supervision/Setup   LB Dressing Assistance 4  Minimal Assistance   Toileting Assistance  4  Minimal Assistance   Bed Mobility   Supine to Sit Unable to assess   Additional Comments pt seated in bedside chair upon arrival   Transfers   Sit to Stand 5  Supervision   Additional items Increased time required;Verbal cues;Armrests;Assist x 1   Stand to Sit 5  Supervision   Additional items Increased time required;Verbal cues;Armrests   Additional Comments cues for positioning   Functional Mobility   Functional Mobility 5  Supervision   Additional Comments assist x 1 w/ no AD   Balance   Static Sitting Good   Dynamic Sitting Fair +   Static Standing Fair   Dynamic Standing Fair -   Ambulatory Fair -   Activity Tolerance   Activity Tolerance Patient limited by fatigue;Patient tolerated treatment well   Nurse Made Aware appropriate to see per Godwin ROSALES Assessment   RUE Assessment WFL  (4/5)   LUE Assessment   LUE Assessment WFL  (4/5)   Hand Function   Gross Motor Coordination Functional   Fine Motor Coordination Functional   Sensation   Light Touch No apparent deficits   Sharp/Dull No apparent deficits   Proprioception   Proprioception No apparent deficits   Vision-Basic Assessment   Current Vision Wears glasses all the time   Vision - Complex Assessment   Ocular Range of Motion [facility]   Acuity Able to read clock/calendar on wall without difficulty   Perception   Inattention/Neglect Appears intact   Cognition   Overall Cognitive Status [facility]   Arousal/Participation Alert; Cooperative   Attention Attends with cues to redirect   Orientation Level Oriented to person;Oriented to place;Oriented to time;Oriented to situation   Memory Decreased short term memory   Following Commands Follows one step commands with increased time or repetition   Comments pt is very hard of hearing, 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, questionable cognition, requires safety education   Assessment   Limitation Decreased ADL status; Decreased UE strength;Decreased cognition;Decreased endurance;Decreased Safe judgement during ADL;Decreased self-care trans;Decreased high-level ADLs   Prognosis Good   Assessment Pt is a 80 y o  female seen for OT evaluation s/p admit to [facility] on 2/4/2018 w/ hyponatremia, Pneumonia due to infectious organism  Comorbidities affecting pt's functional performance at time of assessment include: recent diagnosis of flu at [facility] in January, dysphagia, acute metabolic encephalopathy, anxiety, HTN, h/o THR  Personal factors affecting pt at time of IE include: lives alone, hard of hearing  Prior to admission, pt was living alone in [facility] and pt reports indepenent w/ ADLs, independent IADLs, independent functional transfers and mobility w/ no AD, driving  Upon evaluation: Pt requires supervision sit<>Stand transfers, supervision functional transfers and mobility w/ no AD, MIN assist LB ADLS, MIN assist toileting 2* the following deficits impacting occupational performance: increased fatigue, questionable STM, impaired insight, impaired balance, impaired activity tolerance, impaired insight  Pt to benefit from continued skilled OT tx while in the hospital to address deficits as defined above and maximize level of functional independence w ADL's and functional mobility  Occupational Performance areas to address include: grooming, bathing/shower, toilet hygiene, dressing, medication management, functional mobility, clothing management, cleaning and meal prep, formal cognitive evaluation, home safety education  From OT standpoint, recommendation at time of d/c would be short term rehab vs  HOME w/ OT and family support  Goals   Patient Goals "not be constipated and once I go to the bathroom I will feel better"   LTG Time Frame 7-10   Long Term Goal please see below goals   Plan   Treatment Interventions ADL retraining;Functional transfer training;UE strengthening/ROM; Endurance training;Patient/family training; Compensatory technique education; Energy conservation; Activityengagement;Equipment evaluation/education;Cognitive reorientation   Goal Expiration Date 02/17/18   OT Frequency 3-5x/wk   Recommendation   OT Discharge Recommendation (STR vs  HOME OT)   Barthel Index   Feeding 10   Bathing 0   Grooming Score 5   Dressing Score 5   Bladder Score 10   Bowels Score 10   Toilet Use Score 5   Transfers (Bed/Chair) Score 10   Mobility (Level Surface) Score 0   Stairs Score 0   Barthel Index Score 55   Modified [place]ik Scale   Modified [place] Scale 4     Occupational Therapy Goals to be met in 7-10 days:  1) Pt will improve activity tolerance to G for min 30 min txment sessions  2) Pt will complete ADLs/self care w/ mod I   3) Pt will complete toileting w/ mod I w/ G hygiene/thoroughness using DME PRN  4) Pt will improve functional transfers on/off all surfaces using DME PRN w/ G balance/safety including toileting w/ mod I  5) Pt will improve fx'l mobility during I/ADl/leisure tasks using DME PRN w/ g balance/safety w/ mod I  6) Pt will engage in ongoing cognitive assessment w/ G participation to A w/ safe d/c planning/recommendations  7) Pt will demonstrate G carryover of pt/caregiver education and training as appropriate w/ mod I  w/ G tolerance  8) Pt will engage in depression screen/leisure interest checklist w/ G participation to monitor s/s depression and ID 3 positive coping strategies to A w/ emotional regulation and management  9) Pt will demonstrate 100% carryover of E C  techniques w/ mod I t/o fx'l I/ADL/leisure tasks w/o cues s/p skilled education  10) Pt will demonstrate improved bed mobility to MOD I  11) Pt will demonstrate improved standing tolerance to 3-5 minutes during functional tasks w/ no LOB to enhance ADL performance and IADLS  12) Pt will demonstrate improved b/l UE strength by 1 MMT grade to enhance ADLS and functional transfers  Documentation completed by: Fabrice Perkins MS, OTR/L

## 2018-02-07 NOTE — PROGRESS NOTES
Progress Note - Kelsey Sherman 80 y o  female MRN: [id number]    Unit/Bed#: E5 -01 Encounter: [id number]      Assessment/Plan:  1-acute/chronic hyponatremia: sodium on discharge on 2/2 for [facility] was 130  Her Na appears to range 128-130  Her [facility] records reveal history of chronic hyponatremia due to SIADH  Her serum osm was low               -patient was started on normal saline  Her sodium has gradually corrected to her baseline  She was taken off IV fluids yesterday and her sodium remains at her baseline at 132 today               -patient has a history of chronic hyponatremia  She will be referred back to her primary care physician at [facility]   Will not institute a new workup for her chronic hyponatremia at this time      1-zumgmpwod-bqosbzgx pneumonia:  Patient was noted to have a right lower lobe pneumonia on admission  On her prior admissions to [facility] earlier this week she was noted to have infiltrate involving both bases  Patient had not been compliant with her Levaquin dose               -patient's case was discussed with the pulmonologist   On her current imaging she has a small infiltrate at the right base  This is likely related to her previously diagnosed pneumonia rather than a new pneumonia  As patient had possible GI upset related to her levofloxacin, her antibiotics were changed to ceftriaxone  Patient had presented with nausea and vomiting  It is possible that this infiltrate is related to aspiration pneumonia/pneumonitis  However as she is clinically improving the pulmonary service does not recommend any anaerobic coverage such as Flagyl to her regimen                 -patient is currently afebrile  She denies any shortness of breath or cough  Her lungs are clear  Continue antibiotics with Ceftin to complete course of treatment for pneumonia     3-no dysphagia: There were concerns that patient was exhibiting signs of dysphagia    In light of her right lower lobe infiltrate which could be representative of aspiration, patient was evaluated by speech therapy  They did not note any concerns of aspiration  She is currently on a regular diet with thin liquids      4-acute metabolic encephalopathy:  Patient reportedly had confusion upon presentation  This was felt to be secondary to acute toxic/metabolic encephalopathy secondary to her significant hyponatremia  Her sodium was 112  This has improved  She is currently awake, alert, and oriented      5-nausea/vomiting:  Patient presented with nausea and vomiting  Review of her [facility] charts, it is notable that she has frequent admissions and ER visits and nausea and vomiting are present during those visits  I reviewed her family physicians notes, any relates the patient has significant anxiety, which manifests frequently as nausea  This had improved when she was placed on higher doses of Xanax in addition to her Zoloft  Patient's niece has also noted the correlation of nausea with patient's episodes of anxiety  -patient declines GI workup  She relates her nausea is primarily associated with acute illnesses or antibiotics    -she has nausea again this morning  Will monitor her p o  intake  She will be monitored in the hospital to ensure that the nausea resolves and she tolerates full p o  before she is discharged home, as it is her episodes of vomiting the precipitated her acute worsening of her chronic hyponatremia which led to her generalized weakness and hospitalization      6-diabetes type 2:  On diet therapy as an outpatient  Sliding scale insulin as needed      7-essential hypertension:   continue metoprolol 100 mg b i d , Norvasc 10 mg daily  Blood pressure this morning 158/74  However she had an episode yesterday of 98/58  As this was her only reading with a systolic less than 396, and the rest of her blood pressure readings have been greater than 120, this may have been a spurious value  Continue to monitor     8-1 2 cm cystic lesion in the pancreatic head:  Incidental finding  Will for back to primary care provider for further follow-up     9-ambulatory dysfunction/generalized weakness:  Noted on admission  Secondary to her hyponatremia  Has markedly improved  Await physical therapy/occupational therapy evaluation     10-coronary artery disease: Without any evidence of acute ischemia  Continue aspirin, beta-blocker, and statin      11-generalized anxiety disorder:   continue home Xanax  Patient's dose was recently increased and she relates that she takes it scheduled 0 25 mg t i d        12-hyperlipidemia:  Continue statin     13-family:  called chanelle Cisse Boots and left message  PT/OT eval      VTE Pharmacologic Prophylaxis: Heparin  VTE Mechanical Prophylaxis: sequential compression device    Certification Statement: The patient will continue to require additional inpatient hospital stay due to need for further acute intervention for nausea, hyponatremia    Status: inpatient     ===================================================================    Subjective:  Patient relates that she feels slightly nauseated  She notes she had her breakfast without any difficulty  She denies any vomiting  She denies any abdominal pain or cramping  She denies any indigestion  She denies any postprandial abdominal pain  Patient relates that she did not have indigestion or nausea until she was hospitalized with influenza  She notes that her stomach becomes upset with antibiotics  She denies any diarrhea  She denies constipation  She denies any shortness of breath, chest congestion or cough  She denies any dizziness, lightheadedness, weakness, numbness, balance problems  She denies any pain anywhere at all    She denies any headache, chest pain, back pain, abdominal pain      Physical Exam:   Temp:  [97 5 °F (36 4 °C)-99 3 °F (37 4 °C)] 97 5 °F (36 4 °C)  HR:  [72-79] 79  Resp:  [18-23] 18  BP: ()/(58-74) 158/74    Gen:  Pleasant, non-tachypnic, non-dyspnic  Conversant  Sitting up in a chair reading the newspaper  Heart: regular rate and rhythm, S1S2 present, no murmur, rub or gallop  Lungs: clear to ausculatation bilaterally  No wheezing, crackles, or rhonchi  No accessory muscle use or respiratory distress  Abd: soft, non-tender, non-distended  NABS, no guarding, rebound or peritoneal signs  Extremities: no clubbing, cyanosis or edema  2+pedal pulses bilaterally  Full range of motion  Neuro: awake, alert  Fluent and goal directed speech  Oriented  Strength globally intact  Skin: warm and dry: no petechiae, purpura and rash  LABS:     Results from last 7 days  Lab Units 02/07/18 0515 02/05/18 0527 02/04/18  1836   WBC Thousand/uL 6 77 14 78* 10 78*   HEMOGLOBIN g/dL 9 3* 10 3* 10 5*   HEMATOCRIT % 28 8* 29 0* 30 6*   PLATELETS Thousands/uL 276 361 351       Results from last 7 days  Lab Units 02/07/18  0515 02/07/18  0020 02/06/18  2110  02/06/18  0613  02/05/18  0527   SODIUM mmol/L 132* 130* 130*  < > 131*  131*  < > 117*   POTASSIUM mmol/L 3 5  --   --   --  3 4*  --  3 9   CHLORIDE mmol/L 98*  --   --   --  97*  --  85*   CO2 mmol/L 27  --   --   --  27  --  20*   BUN mg/dL 6  --   --   --  5  --  10   CREATININE mg/dL 0 62  --   --   --  0 65  --  0 66   GLUCOSE RANDOM mg/dL 115  --   --   --  163*  --  149*   CALCIUM mg/dL 8 2*  --   --   --  8 1*  --  8 1*   < > = values in this interval not displayed  Hospital Data:    Urinalysis:  Negative nitrates/leukocyte esterase  Negative Legionella/strep pneumoniae antigen        ---------------------------------------------------------------------------------------------------------------  This note has been constructed using a voice recognition system

## 2018-02-08 VITALS
RESPIRATION RATE: 18 BRPM | DIASTOLIC BLOOD PRESSURE: 74 MMHG | WEIGHT: 143 LBS | OXYGEN SATURATION: 96 % | TEMPERATURE: 98 F | HEART RATE: 64 BPM | SYSTOLIC BLOOD PRESSURE: 124 MMHG | HEIGHT: 63 IN

## 2018-02-08 LAB
ANION GAP SERPL CALCULATED.3IONS-SCNC: 7 MMOL/L (ref 4–13)
BUN SERPL-MCNC: 14 MG/DL (ref 5–25)
CALCIUM SERPL-MCNC: 8.1 MG/DL (ref 8.3–10.1)
CHLORIDE SERPL-SCNC: 99 MMOL/L (ref 100–108)
CO2 SERPL-SCNC: 27 MMOL/L (ref 21–32)
CREAT SERPL-MCNC: 0.64 MG/DL (ref 0.6–1.3)
GFR SERPL CREATININE-BSD FRML MDRD: 82 ML/MIN/1.73SQ M
GLUCOSE SERPL-MCNC: 117 MG/DL (ref 65–140)
GLUCOSE SERPL-MCNC: 118 MG/DL (ref 65–140)
GLUCOSE SERPL-MCNC: 123 MG/DL (ref 65–140)
GLUCOSE SERPL-MCNC: 133 MG/DL (ref 65–140)
POTASSIUM SERPL-SCNC: 3.8 MMOL/L (ref 3.5–5.3)
SODIUM SERPL-SCNC: 133 MMOL/L (ref 136–145)

## 2018-02-08 PROCEDURE — 82948 REAGENT STRIP/BLOOD GLUCOSE: CPT

## 2018-02-08 PROCEDURE — 99239 HOSP IP/OBS DSCHRG MGMT >30: CPT | Performed by: INTERNAL MEDICINE

## 2018-02-08 PROCEDURE — 80048 BASIC METABOLIC PNL TOTAL CA: CPT | Performed by: INTERNAL MEDICINE

## 2018-02-08 RX ADMIN — ALPRAZOLAM 0.25 MG: 0.25 TABLET ORAL at 15:39

## 2018-02-08 RX ADMIN — CEFDINIR 300 MG: 300 CAPSULE ORAL at 08:30

## 2018-02-08 RX ADMIN — SERTRALINE HYDROCHLORIDE 50 MG: 50 TABLET ORAL at 08:30

## 2018-02-08 RX ADMIN — ASPIRIN 81 MG: 81 TABLET ORAL at 08:30

## 2018-02-08 RX ADMIN — PRAVASTATIN SODIUM 20 MG: 40 TABLET ORAL at 15:39

## 2018-02-08 RX ADMIN — HEPARIN SODIUM 5000 UNITS: 5000 INJECTION, SOLUTION INTRAVENOUS; SUBCUTANEOUS at 13:24

## 2018-02-08 RX ADMIN — ALPRAZOLAM 0.25 MG: 0.25 TABLET ORAL at 05:40

## 2018-02-08 RX ADMIN — METOPROLOL TARTRATE 100 MG: 100 TABLET ORAL at 08:30

## 2018-02-08 RX ADMIN — HEPARIN SODIUM 5000 UNITS: 5000 INJECTION, SOLUTION INTRAVENOUS; SUBCUTANEOUS at 05:05

## 2018-02-08 RX ADMIN — AMLODIPINE BESYLATE 10 MG: 10 TABLET ORAL at 08:30

## 2018-02-08 RX ADMIN — PANTOPRAZOLE SODIUM 40 MG: 40 TABLET, DELAYED RELEASE ORAL at 05:05

## 2018-02-08 NOTE — DISCHARGE INSTRUCTIONS
Please take your medications as directed  Your home medication Micardis/telmisartan, for blood pressure has been stopped temporarily to avoid low blood pressure  Continue your metoprolol and Norvasc/amlodipine    Please see your family physician in 1 week for a checkup  Please return to the ER with any problems at all

## 2018-10-28 ENCOUNTER — HOSPITAL ENCOUNTER (EMERGENCY)
Facility: HOSPITAL | Age: 83
Discharge: HOME | End: 2018-10-28
Attending: EMERGENCY MEDICINE
Payer: COMMERCIAL

## 2018-10-28 VITALS
TEMPERATURE: 97.9 F | SYSTOLIC BLOOD PRESSURE: 167 MMHG | RESPIRATION RATE: 16 BRPM | OXYGEN SATURATION: 98 % | HEART RATE: 59 BPM | DIASTOLIC BLOOD PRESSURE: 84 MMHG

## 2018-10-28 DIAGNOSIS — R10.9 ABDOMINAL PAIN: Primary | ICD-10-CM

## 2018-10-28 LAB
ALBUMIN SERPL BCP-MCNC: 3.7 G/DL (ref 3.5–5)
ALP SERPL-CCNC: 67 U/L (ref 46–116)
ALT SERPL W P-5'-P-CCNC: 20 U/L (ref 12–78)
ANION GAP SERPL CALCULATED.3IONS-SCNC: 6 MMOL/L (ref 4–13)
AST SERPL W P-5'-P-CCNC: 18 U/L (ref 5–45)
BASOPHILS # BLD AUTO: 0.04 THOUSANDS/ΜL (ref 0–0.1)
BASOPHILS NFR BLD AUTO: 1 % (ref 0–1)
BILIRUB DIRECT SERPL-MCNC: 0.2 MG/DL (ref 0–0.2)
BILIRUB SERPL-MCNC: 0.84 MG/DL (ref 0.2–1)
BILIRUB UR QL STRIP: NEGATIVE
BUN SERPL-MCNC: 18 MG/DL (ref 5–25)
CALCIUM SERPL-MCNC: 9.4 MG/DL (ref 8.3–10.1)
CHLORIDE SERPL-SCNC: 96 MMOL/L (ref 100–108)
CLARITY UR: NORMAL
CLARITY, POC: NORMAL
CO2 SERPL-SCNC: 29 MMOL/L (ref 21–32)
COLOR UR: YELLOW
COLOR, POC: YELLOW
CREAT SERPL-MCNC: 0.78 MG/DL (ref 0.6–1.3)
EOSINOPHIL # BLD AUTO: 0.12 THOUSAND/ΜL (ref 0–0.61)
EOSINOPHIL NFR BLD AUTO: 2 % (ref 0–6)
ERYTHROCYTE [DISTWIDTH] IN BLOOD BY AUTOMATED COUNT: 12.7 % (ref 11.6–15.1)
GFR SERPL CREATININE-BSD FRML MDRD: 70 ML/MIN/1.73SQ M
GLUCOSE SERPL-MCNC: 138 MG/DL (ref 65–140)
GLUCOSE UR STRIP-MCNC: NEGATIVE MG/DL
HCT VFR BLD AUTO: 35.7 % (ref 34.8–46.1)
HGB BLD-MCNC: 11.6 G/DL (ref 11.5–15.4)
HGB UR QL STRIP.AUTO: NEGATIVE
IMM GRANULOCYTES # BLD AUTO: 0.04 THOUSAND/UL (ref 0–0.2)
IMM GRANULOCYTES NFR BLD AUTO: 1 % (ref 0–2)
KETONES UR STRIP-MCNC: NEGATIVE MG/DL
LEUKOCYTE ESTERASE UR QL STRIP: NEGATIVE
LIPASE SERPL-CCNC: 107 U/L (ref 73–393)
LYMPHOCYTES # BLD AUTO: 1.65 THOUSANDS/ΜL (ref 0.6–4.47)
LYMPHOCYTES NFR BLD AUTO: 22 % (ref 14–44)
MAGNESIUM SERPL-MCNC: 2 MG/DL (ref 1.6–2.6)
MCH RBC QN AUTO: 30.6 PG (ref 26.8–34.3)
MCHC RBC AUTO-ENTMCNC: 32.5 G/DL (ref 31.4–37.4)
MCV RBC AUTO: 94 FL (ref 82–98)
MONOCYTES # BLD AUTO: 0.53 THOUSAND/ΜL (ref 0.17–1.22)
MONOCYTES NFR BLD AUTO: 7 % (ref 4–12)
NEUTROPHILS # BLD AUTO: 5.03 THOUSANDS/ΜL (ref 1.85–7.62)
NEUTS SEG NFR BLD AUTO: 67 % (ref 43–75)
NITRITE UR QL STRIP: NEGATIVE
NRBC BLD AUTO-RTO: 0 /100 WBCS
PH UR STRIP.AUTO: 7 [PH] (ref 4.5–8)
PLATELET # BLD AUTO: 210 THOUSANDS/UL (ref 149–390)
PMV BLD AUTO: 8.7 FL (ref 8.9–12.7)
POTASSIUM SERPL-SCNC: 4.6 MMOL/L (ref 3.5–5.3)
PROT SERPL-MCNC: 7 G/DL (ref 6.4–8.2)
PROT UR STRIP-MCNC: NEGATIVE MG/DL
RBC # BLD AUTO: 3.79 MILLION/UL (ref 3.81–5.12)
SODIUM SERPL-SCNC: 131 MMOL/L (ref 136–145)
SP GR UR STRIP.AUTO: 1.01 (ref 1–1.03)
UROBILINOGEN UR QL STRIP.AUTO: 0.2 E.U./DL
WBC # BLD AUTO: 7.41 THOUSAND/UL (ref 4.31–10.16)

## 2018-10-28 PROCEDURE — 80076 HEPATIC FUNCTION PANEL: CPT | Performed by: EMERGENCY MEDICINE

## 2018-10-28 PROCEDURE — 83735 ASSAY OF MAGNESIUM: CPT | Performed by: EMERGENCY MEDICINE

## 2018-10-28 PROCEDURE — 83690 ASSAY OF LIPASE: CPT | Performed by: EMERGENCY MEDICINE

## 2018-10-28 PROCEDURE — 81003 URINALYSIS AUTO W/O SCOPE: CPT

## 2018-10-28 PROCEDURE — 85025 COMPLETE CBC W/AUTO DIFF WBC: CPT | Performed by: EMERGENCY MEDICINE

## 2018-10-28 PROCEDURE — 36415 COLL VENOUS BLD VENIPUNCTURE: CPT | Performed by: EMERGENCY MEDICINE

## 2018-10-28 PROCEDURE — 99283 EMERGENCY DEPT VISIT LOW MDM: CPT

## 2018-10-28 PROCEDURE — 96360 HYDRATION IV INFUSION INIT: CPT

## 2018-10-28 PROCEDURE — 80048 BASIC METABOLIC PNL TOTAL CA: CPT | Performed by: EMERGENCY MEDICINE

## 2018-10-28 PROCEDURE — 96361 HYDRATE IV INFUSION ADD-ON: CPT

## 2018-10-28 RX ORDER — DICYCLOMINE HCL 20 MG
20 TABLET ORAL 2 TIMES DAILY
Qty: 10 TABLET | Refills: 0 | Status: SHIPPED | OUTPATIENT
Start: 2018-10-28 | End: 2018-11-02

## 2018-10-28 RX ORDER — DICYCLOMINE HCL 20 MG
20 TABLET ORAL ONCE
Status: COMPLETED | OUTPATIENT
Start: 2018-10-28 | End: 2018-10-28

## 2018-10-28 RX ADMIN — DICYCLOMINE HYDROCHLORIDE 20 MG: 20 TABLET ORAL at 16:16

## 2018-10-28 RX ADMIN — SODIUM CHLORIDE 1000 ML: 0.9 INJECTION, SOLUTION INTRAVENOUS at 16:26

## 2018-10-28 NOTE — ED PROVIDER NOTES
History  Chief Complaint   Patient presents with    Generalized Body Aches     Pt reports, "I just felt rotten " Reports ate ravoli and apple sauce and didn't feel well after  Per EMS, pt has been brought to various emergency departments for similar complaints  Asked to come to this facility because previous facilities, "Did nothing "      79 YO female with Hx of anxiety presents with an upset stomach  States this began in the morning, has been aching, constant  Pt states she tried to eat something but this did not help her symptoms  Pt states she has not had similar in the past  Unsure if this could be her anxiety, she states she tried one of her anxiety medications and it has not helped  She denies nausea or vomiting, no diarrhea or constipation  Denies recent fevers  States she has recently been seen numerous times at [facility] due to anxiety  Records show this anxiety may stem from her living alone and not being able to drive, as such she has had a Hx of frequent calls to 911 to be brought to the ED  Pt has had admissions for hyponatremia  She has been getting around ok  Pt denies CP/SOB/F/C/N/V/D/C, no dysuria, burning on urination or blood in urine  History provided by:  Patient   used: No    Abdominal Pain   Pain location:  Periumbilical  Pain quality: aching    Pain radiates to:  Does not radiate  Pain severity:  Moderate  Onset quality:  Gradual  Timing:  Constant  Progression:  Waxing and waning  Chronicity:  New  Relieved by:  Nothing  Worsened by:  Nothing  Ineffective treatments: Xanax  Associated symptoms: no anorexia, no belching, no chest pain, no chills, no constipation, no cough, no diarrhea, no dysuria, no fatigue, no fever, no hematemesis, no hematuria, no shortness of breath, no sore throat and no vomiting        Prior to Admission Medications   Prescriptions Last Dose Informant Patient Reported? Taking?    ALPRAZolam (XANAX) 0 25 mg tablet   Yes No   Sig: Take 0 25 mg by mouth 2 (two) times a day Plus once prn   albuterol (PROVENTIL HFA,VENTOLIN HFA) 90 mcg/act inhaler   Yes No   Sig: Inhale 2 puffs every 6 (six) hours   amLODIPine (NORVASC) 10 mg tablet   Yes No   Sig: Take 10 mg by mouth daily   aspirin 81 MG tablet   Yes No   Sig: Take 81 mg by mouth daily   famotidine (PEPCID) 20 mg tablet   Yes No   Sig: Take 20 mg by mouth daily   metoprolol tartrate (LOPRESSOR) 100 mg tablet   Yes No   Sig: Take 100 mg by mouth 2 (two) times a day   sertraline (ZOLOFT) 50 mg tablet   Yes No   Sig: Take 50 mg by mouth daily   simvastatin (ZOCOR) 10 mg tablet   Yes No   Sig: Take 10 mg by mouth daily at bedtime as needed      Facility-Administered Medications: None       Past Medical History:   Diagnosis Date    DM (diabetes mellitus) ([facility] [address] )     Hypertension        Past Surgical History:   Procedure Laterality Date    NO PAST SURGERIES         History reviewed  No pertinent family history  I have reviewed and agree with the history as documented  Social History   Substance Use Topics    Smoking status: Never Smoker    Smokeless tobacco: Never Used    Alcohol use No        Review of Systems   Constitutional: Negative for chills, fatigue and fever  HENT: Negative for dental problem and sore throat  Eyes: Negative for visual disturbance  Respiratory: Negative for cough and shortness of breath  Cardiovascular: Negative for chest pain  Gastrointestinal: Positive for abdominal pain  Negative for anorexia, constipation, diarrhea, hematemesis and vomiting  Genitourinary: Negative for dysuria, frequency and hematuria  Musculoskeletal: Negative for arthralgias  Skin: Negative for rash  Neurological: Negative for dizziness, weakness and light-headedness  Psychiatric/Behavioral: Negative for agitation, behavioral problems and confusion  All other systems reviewed and are negative        Physical Exam  Physical Exam   Constitutional: She is oriented to person, place, and time  She appears well-developed and well-nourished  HENT:   Head: Normocephalic and atraumatic  Eyes: Pupils are equal, round, and reactive to light  EOM are normal    Neck: Normal range of motion  Neck supple  Cardiovascular: Normal rate, regular rhythm and normal heart sounds  Pulmonary/Chest: Effort normal and breath sounds normal    Abdominal: Soft  There is no tenderness  Musculoskeletal: Normal range of motion  Neurological: She is alert and oriented to person, place, and time  Skin: Skin is warm and dry  Psychiatric: She has a normal mood and affect  Her behavior is normal  Thought content normal    Nursing note and vitals reviewed        Vital Signs  ED Triage Vitals [10/28/18 1537]   Temperature Pulse Respirations Blood Pressure SpO2   97 9 °F (36 6 °C) 66 16 (!) 176/103 98 %      Temp Source Heart Rate Source Patient Position - Orthostatic VS BP Location FiO2 (%)   Oral Monitor Sitting Left arm --      Pain Score       No Pain           Vitals:    10/28/18 1537 10/28/18 1705   BP: (!) 176/103 167/84   Pulse: 66 59   Patient Position - Orthostatic VS: Sitting Lying       Visual Acuity  Visual Acuity      Most Recent Value   L Pupil Size (mm)  3   R Pupil Size (mm)  3          ED Medications  Medications   sodium chloride 0 9 % bolus 1,000 mL (0 mL Intravenous Stopped 10/28/18 1817)   dicyclomine (BENTYL) tablet 20 mg (20 mg Oral Given 10/28/18 1616)       Diagnostic Studies  Results Reviewed     Procedure Component Value Units Date/Time    POCT urinalysis dipstick [[id number]]  (Normal) Resulted:  10/28/18 1731    Lab Status:  Edited Result - FINAL Specimen:  Urine Updated:  10/28/18 1735     Color, UA YELLOW     Clarity, UA slightly cloudy    ED Urine Macroscopic [[id number]] Collected:  10/28/18 1654    Lab Status:  Final result Specimen:  Urine Updated:  10/28/18 1642     Color, UA Yellow     Clarity, UA Slightly Cloudy     pH, UA 7 0     Leukocytes, UA Negative     Nitrite, UA Negative Protein, UA Negative mg/dl      Glucose, UA Negative mg/dl      Ketones, UA Negative mg/dl      Urobilinogen, UA 0 2 E U /dl      Bilirubin, UA Negative     Blood, UA Negative     Specific Gravity, UA 1 015    Narrative:       CLINITEK RESULT    Basic metabolic panel [[id number]]  (Abnormal) Collected:  10/28/18 1621    Lab Status:  Final result Specimen:  Blood from Arm, Right Updated:  10/28/18 1642     Sodium 131 (L) mmol/L      Potassium 4 6 mmol/L      Chloride 96 (L) mmol/L      CO2 29 mmol/L      ANION GAP 6 mmol/L      BUN 18 mg/dL      Creatinine 0 78 mg/dL      Glucose 138 mg/dL      Calcium 9 4 mg/dL      eGFR 70 ml/min/1 73sq m     Narrative:         National Kidney Disease Education Program recommendations are as follows:  GFR calculation is accurate only with a steady state creatinine  Chronic Kidney disease less than 60 ml/min/1 73 sq  meters  Kidney failure less than 15 ml/min/1 73 sq  meters      Hepatic function panel [[id number]]  (Normal) Collected:  10/28/18 1621    Lab Status:  Final result Specimen:  Blood from Arm, Right Updated:  10/28/18 1642     Total Bilirubin 0 84 mg/dL      Bilirubin, Direct 0 20 mg/dL      Alkaline Phosphatase 67 U/L      AST 18 U/L      ALT 20 U/L      Total Protein 7 0 g/dL      Albumin 3 7 g/dL     Magnesium [[id number]]  (Normal) Collected:  10/28/18 1621    Lab Status:  Final result Specimen:  Blood from Arm, Right Updated:  10/28/18 1642     Magnesium 2 0 mg/dL     Lipase [[id number]]  (Normal) Collected:  10/28/18 1621    Lab Status:  Final result Specimen:  Blood from Arm, Right Updated:  10/28/18 1642     Lipase 107 u/L     CBC and differential [[id number]]  (Abnormal) Collected:  10/28/18 1621    Lab Status:  Final result Specimen:  Blood from Arm, Right Updated:  10/28/18 1628     WBC 7 41 Thousand/uL      RBC 3 79 (L) Million/uL      Hemoglobin 11 6 g/dL      Hematocrit 35 7 %      MCV 94 fL      MCH 30 6 pg      MCHC 32 5 g/dL      RDW 12 7 %      MPV 8 7 (L) fL Platelets 238 Thousands/uL      nRBC 0 /100 WBCs      Neutrophils Relative 67 %      Immat GRANS % 1 %      Lymphocytes Relative 22 %      Monocytes Relative 7 %      Eosinophils Relative 2 %      Basophils Relative 1 %      Neutrophils Absolute 5 03 Thousands/µL      Immature Grans Absolute 0 04 Thousand/uL      Lymphocytes Absolute 1 65 Thousands/µL      Monocytes Absolute 0 53 Thousand/µL      Eosinophils Absolute 0 12 Thousand/µL      Basophils Absolute 0 04 Thousands/µL                  No orders to display              Procedures  Procedures       Phone Contacts  ED Phone Contact    ED Course                               MDM  Number of Diagnoses or Management Options  Abdominal pain: new and requires workup  Diagnosis management comments: 1  Abdominal pain - Pt with benign exam, will check electrolytes, CBC, urine for infection  Will give fluids and try Bentyl  Amount and/or Complexity of Data Reviewed  Clinical lab tests: ordered and reviewed  Review and summarize past medical records: yes    Patient Progress  Patient progress: improved    CritCare Time    Disposition  Final diagnoses:   Abdominal pain     Time reflects when diagnosis was documented in both MDM as applicable and the Disposition within this note     Time User Action Codes Description Comment    10/28/2018  6:11 PM Lefty Jackson [R10 9] Abdominal pain       ED Disposition     ED Disposition Condition Comment    Discharge  Mone Luke discharge to home/self care      Condition at discharge: Improved      Follow-up Information    None         Discharge Medication List as of 10/28/2018  6:20 PM      START taking these medications    Details   dicyclomine (BENTYL) 20 mg tablet Take 1 tablet (20 mg total) by mouth 2 (two) times a day, Starting Sun 10/28/2018, Print         CONTINUE these medications which have NOT CHANGED    Details   albuterol (PROVENTIL HFA,VENTOLIN HFA) 90 mcg/act inhaler Inhale 2 puffs every 6 (six) hours, Starting YULIYA Ballard 1/31/2018, Until Thu 1/31/2019, Historical Med      ALPRAZolam (XANAX) 0 25 mg tablet Take 0 25 mg by mouth 2 (two) times a day Plus once prn, Starting Fri 2/2/2018, Historical Med      amLODIPine (NORVASC) 10 mg tablet Take 10 mg by mouth daily, Starting Wed 9/13/2017, Historical Med      aspirin 81 MG tablet Take 81 mg by mouth daily, Starting Wed 9/13/2017, Historical Med      famotidine (PEPCID) 20 mg tablet Take 20 mg by mouth daily, Starting Wed 1/31/2018, Historical Med      metoprolol tartrate (LOPRESSOR) 100 mg tablet Take 100 mg by mouth 2 (two) times a day, Starting Wed 9/13/2017, Historical Med      sertraline (ZOLOFT) 50 mg tablet Take 50 mg by mouth daily, Starting Wed 9/13/2017, Historical Med      simvastatin (ZOCOR) 10 mg tablet Take 10 mg by mouth daily at bedtime as needed, Starting Wed 9/13/2017, Historical Med           No discharge procedures on file      ED Provider  Electronically Signed by           Nika Triplett MD  10/29/18 5133

## 2018-10-28 NOTE — ED NOTES
When asking pt for reason for visit, or to expound about her current symptoms, she states, "It's in the records "      Yimi Parker, RN  10/28/18 0309

## 2018-10-28 NOTE — DISCHARGE INSTRUCTIONS
You can try the Bentyl if you have the abdominal pain again  Speak with your family doctor, you should be seen in the office for further evaluation and management  Abdominal Pain, Ambulatory Care   GENERAL INFORMATION:   Abdominal pain  can be dull, achy, or sharp  You may have pain in one area of your abdomen, or in your entire abdomen  Your pain may be caused by constipation, food sensitivity or poisoning, infection, or a blockage  Abdominal pain can also be caused by a hernia, appendicitis, or an ulcer  The cause of your abdominal pain may be unknown  Seek immediate care for the following symptoms:   · New chest pain or shortness of breath    · Pulsing pain in your upper abdomen or lower back that suddenly becomes constant    · Pain in the right lower abdominal area that worsens with movement    · Fever over 100 4°F (38°C) or shaking chills    · Vomiting and you cannot keep food or fluids down    · Pain does not improve or gets worse over the next 8 to 12 hours    · Blood in your vomit or bowel movements, or they look black and tarry    · Skin or the whites of your eyes turn yellow    · Large amount of vaginal bleeding that is not your monthly period  Treatment for abdominal pain  may include medicine to calm your stomach, prevent vomiting, or decrease pain  Follow up with your healthcare provider as directed:  Write down your questions so you remember to ask them during your visits  CARE AGREEMENT:   You have the right to help plan your care  Learn about your health condition and how it may be treated  Discuss treatment options with your caregivers to decide what care you want to receive  You always have the right to refuse treatment  The above information is an  only  It is not intended as medical advice for individual conditions or treatments  Talk to your doctor, nurse or pharmacist before following any medical regimen to see if it is safe and effective for you    © 2014 [facility] [address] [place] [address] is for End User's use only and may not be sold, redistributed or otherwise used for commercial purposes  All illustrations and images included in CareNotes® are the copyrighted property of A D A M [employer]  or Darvin Trinidad

## 2018-11-02 ENCOUNTER — HOSPITAL ENCOUNTER (EMERGENCY)
Facility: HOSPITAL | Age: 83
Discharge: HOME | End: 2018-11-02
Attending: EMERGENCY MEDICINE | Admitting: EMERGENCY MEDICINE
Payer: COMMERCIAL

## 2018-11-02 VITALS
HEART RATE: 83 BPM | DIASTOLIC BLOOD PRESSURE: 70 MMHG | TEMPERATURE: 97.8 F | SYSTOLIC BLOOD PRESSURE: 157 MMHG | OXYGEN SATURATION: 97 % | RESPIRATION RATE: 18 BRPM

## 2018-11-02 DIAGNOSIS — F41.9 ANXIETY: Primary | ICD-10-CM

## 2018-11-02 LAB
ALBUMIN SERPL BCP-MCNC: 3.7 G/DL (ref 3.5–5)
ALP SERPL-CCNC: 65 U/L (ref 46–116)
ALT SERPL W P-5'-P-CCNC: 19 U/L (ref 12–78)
AMPHETAMINES SERPL QL SCN: NEGATIVE
ANION GAP SERPL CALCULATED.3IONS-SCNC: 5 MMOL/L (ref 4–13)
AST SERPL W P-5'-P-CCNC: 15 U/L (ref 5–45)
ATRIAL RATE: 61 BPM
BACTERIA UR QL AUTO: ABNORMAL /HPF
BARBITURATES UR QL: NEGATIVE
BASOPHILS # BLD AUTO: 0.03 THOUSANDS/ΜL (ref 0–0.1)
BASOPHILS NFR BLD AUTO: 1 % (ref 0–1)
BENZODIAZ UR QL: POSITIVE
BILIRUB SERPL-MCNC: 0.99 MG/DL (ref 0.2–1)
BILIRUB UR QL STRIP: NEGATIVE
BUN SERPL-MCNC: 13 MG/DL (ref 5–25)
CALCIUM SERPL-MCNC: 9 MG/DL (ref 8.3–10.1)
CHLORIDE SERPL-SCNC: 96 MMOL/L (ref 100–108)
CLARITY UR: CLEAR
CO2 SERPL-SCNC: 30 MMOL/L (ref 21–32)
COCAINE UR QL: NEGATIVE
COLOR UR: YELLOW
CREAT SERPL-MCNC: 0.74 MG/DL (ref 0.6–1.3)
EOSINOPHIL # BLD AUTO: 0.13 THOUSAND/ΜL (ref 0–0.61)
EOSINOPHIL NFR BLD AUTO: 2 % (ref 0–6)
ERYTHROCYTE [DISTWIDTH] IN BLOOD BY AUTOMATED COUNT: 12.7 % (ref 11.6–15.1)
ETHANOL EXG-MCNC: 0 MG/DL
GFR SERPL CREATININE-BSD FRML MDRD: 74 ML/MIN/1.73SQ M
GLUCOSE SERPL-MCNC: 148 MG/DL (ref 65–140)
GLUCOSE UR STRIP-MCNC: NEGATIVE MG/DL
HCT VFR BLD AUTO: 35.2 % (ref 34.8–46.1)
HGB BLD-MCNC: 11.6 G/DL (ref 11.5–15.4)
HGB UR QL STRIP.AUTO: NEGATIVE
IMM GRANULOCYTES # BLD AUTO: 0.02 THOUSAND/UL (ref 0–0.2)
IMM GRANULOCYTES NFR BLD AUTO: 0 % (ref 0–2)
KETONES UR STRIP-MCNC: NEGATIVE MG/DL
LEUKOCYTE ESTERASE UR QL STRIP: ABNORMAL
LYMPHOCYTES # BLD AUTO: 1.38 THOUSANDS/ΜL (ref 0.6–4.47)
LYMPHOCYTES NFR BLD AUTO: 26 % (ref 14–44)
MCH RBC QN AUTO: 30.9 PG (ref 26.8–34.3)
MCHC RBC AUTO-ENTMCNC: 33 G/DL (ref 31.4–37.4)
MCV RBC AUTO: 94 FL (ref 82–98)
METHADONE UR QL: NEGATIVE
MONOCYTES # BLD AUTO: 0.47 THOUSAND/ΜL (ref 0.17–1.22)
MONOCYTES NFR BLD AUTO: 9 % (ref 4–12)
NEUTROPHILS # BLD AUTO: 3.31 THOUSANDS/ΜL (ref 1.85–7.62)
NEUTS SEG NFR BLD AUTO: 62 % (ref 43–75)
NITRITE UR QL STRIP: NEGATIVE
NON-SQ EPI CELLS URNS QL MICRO: ABNORMAL /HPF
NRBC BLD AUTO-RTO: 0 /100 WBCS
OPIATES UR QL SCN: NEGATIVE
P AXIS: 57 DEGREES
PCP UR QL: NEGATIVE
PH UR STRIP.AUTO: 7.5 [PH] (ref 4.5–8)
PLATELET # BLD AUTO: 200 THOUSANDS/UL (ref 149–390)
PMV BLD AUTO: 8.8 FL (ref 8.9–12.7)
POTASSIUM SERPL-SCNC: 4 MMOL/L (ref 3.5–5.3)
PR INTERVAL: 214 MS
PROT SERPL-MCNC: 6.9 G/DL (ref 6.4–8.2)
PROT UR STRIP-MCNC: NEGATIVE MG/DL
QRS AXIS: 109 DEGREES
QRSD INTERVAL: 154 MS
QT INTERVAL: 442 MS
QTC INTERVAL: 444 MS
RBC # BLD AUTO: 3.76 MILLION/UL (ref 3.81–5.12)
RBC #/AREA URNS AUTO: ABNORMAL /HPF
SODIUM SERPL-SCNC: 131 MMOL/L (ref 136–145)
SP GR UR STRIP.AUTO: 1.01 (ref 1–1.03)
T WAVE AXIS: 17 DEGREES
THC UR QL: NEGATIVE
TSH SERPL DL<=0.05 MIU/L-ACNC: 1.43 UIU/ML (ref 0.36–3.74)
UROBILINOGEN UR QL STRIP.AUTO: 0.2 E.U./DL
VENTRICULAR RATE: 61 BPM
WBC # BLD AUTO: 5.34 THOUSAND/UL (ref 4.31–10.16)
WBC #/AREA URNS AUTO: ABNORMAL /HPF

## 2018-11-02 PROCEDURE — 93010 ELECTROCARDIOGRAM REPORT: CPT | Performed by: INTERNAL MEDICINE

## 2018-11-02 PROCEDURE — 80307 DRUG TEST PRSMV CHEM ANLYZR: CPT

## 2018-11-02 PROCEDURE — 84443 ASSAY THYROID STIM HORMONE: CPT

## 2018-11-02 PROCEDURE — 99284 EMERGENCY DEPT VISIT MOD MDM: CPT

## 2018-11-02 PROCEDURE — 36415 COLL VENOUS BLD VENIPUNCTURE: CPT

## 2018-11-02 PROCEDURE — 93005 ELECTROCARDIOGRAM TRACING: CPT

## 2018-11-02 PROCEDURE — 82075 ASSAY OF BREATH ETHANOL: CPT

## 2018-11-02 PROCEDURE — 85025 COMPLETE CBC W/AUTO DIFF WBC: CPT

## 2018-11-02 PROCEDURE — 80053 COMPREHEN METABOLIC PANEL: CPT

## 2018-11-02 PROCEDURE — 82948 REAGENT STRIP/BLOOD GLUCOSE: CPT

## 2018-11-02 PROCEDURE — 81001 URINALYSIS AUTO W/SCOPE: CPT

## 2018-11-02 RX ORDER — SODIUM CHLORIDE 1000 MG
1 TABLET, SOLUBLE MISCELLANEOUS 2 TIMES DAILY WITH MEALS
COMMUNITY

## 2018-11-02 NOTE — DISCHARGE INSTRUCTIONS
Speak with your  to discuss further options for home services  Anxiety   WHAT YOU SHOULD KNOW:   Anxiety is a condition that causes you to feel excessive worry, uneasiness, or fear  Family or work stress, smoking, caffeine, and alcohol can increase your risk for anxiety  Certain medicines or health conditions can also increase your risk  Anxiety may begin gradually, and can become a long-term condition if it is not managed or treated  AFTER YOU LEAVE:   Medicines:   · Medicines  can help you feel more calm and relaxed, and decrease your symptoms  · Take your medicine as directed  Contact your healthcare provider if you think your medicine is not helping or if you have side effects  Tell him if you are allergic to any medicine  Keep a list of the medicines, vitamins, and herbs you take  Include the amounts, and when and why you take them  Bring the list or the pill bottles to follow-up visits  Carry your medicine list with you in case of an emergency  Follow up with your healthcare provider within 2 weeks or as directed:  Write down your questions so you remember to ask them during your visits  Manage anxiety:   · Go to counseling as directed  Cognitive behavioral therapy can help you understand and change how you react to events that trigger your symptoms  · Find ways to manage your symptoms  Activities such as exercise, meditation, or listening to music can help you relax  · Practice deep breathing  Breathing can change how your body reacts to stress  Focus on taking slow, deep breaths several times a day, or during an anxiety attack  Breathe in through your nose, and out through your mouth  · Avoid caffeine  Caffeine can make your symptoms worse  Avoid foods or drinks that are meant to increase your energy level  · Limit or avoid alcohol  Ask your healthcare provider if alcohol is safe for you   You may not be able to drink alcohol if you take certain anxiety or depression medicines  Limit alcohol to 1 drink per day if you are a woman  Limit alcohol to 2 drinks per day if you are a man  A drink of alcohol is 12 ounces of beer, 5 ounces of wine, or 1½ ounces of liquor  Contact your healthcare provider if:   · Your symptoms get worse or do not get better with treatment  · You think your medicine may be causing side effects  · Your anxiety keeps you from doing your regular daily activities  · You have new symptoms since your last visit  · You have questions or concerns about your condition or care  Seek care immediately or call 911 if:   · You have chest pain, tightness, or heaviness that may spread to your shoulders, arms, jaw, neck, or back  · You feel like hurting yourself or someone else  · You feel dizzy, lightheaded, or faint  © 2014 [employer] is for End User's use only and may not be sold, redistributed or otherwise used for commercial purposes  All illustrations and images included in CareNotes® are the copyrighted property of [employer]  or Darvin Trinidad  The above information is an  only  It is not intended as medical advice for individual conditions or treatments  Talk to your doctor, nurse or pharmacist before following any medical regimen to see if it is safe and effective for you

## 2018-11-02 NOTE — ED PROVIDER NOTES
History  Chief Complaint   Patient presents with    Anxiety     Per EMS they have been answering calls to the pt's household regularly for anxiety x4 months  Pt states taking   25mg Xanax at 0430 with no improvement to her anxiety  Pt denies any SI/HI/Hallucination  Pt denies CP, SOB, ABd pain  81 YO female presents for continuing anxiety  Pt is known to provider, she has been having worsening anxiety for weeks, stressors include living alone and losing car  She has had many visits to the ED for this, sometime complaining on only anxiety and other time she notes just feeling sick, she is not able to elaborate on this  Pt states today was a bad day, she had a sudden onset feeling of anxiety, she tried taking Xanax without alleviation  Pt denies new or different symptomatology  She has been followed by [facility], looking through 's note, pt is being evaluated and they are trying to get her into an assisted living situation  Pt mostly states she cannot be alone  She has been taking her medications as prescribed but is not able to get some of her recent prescriptions filled  She does have a friend come over to help intermittently but states this friend also has a lot going on  Pt states she has been having worsening nightmares, these are increasing her anxiety level  Pt denies CP/SOB/F/C/N/V/D/C, no dysuria, burning on urination or blood in urine  History provided by:  Patient, EMS personnel and medical records   used: No    Anxiety   Presenting symptoms: depression    Presenting symptoms: no agitation    Degree of incapacity (severity): Moderate  Onset quality:  Gradual  Timing:  Constant  Progression:  Worsening  Chronicity:  New  Treatment compliance: All of the time  Relieved by:  Nothing  Exacerbated by: Isolation    Ineffective treatments:  Benzodiazepines  Associated symptoms: anxiety, fatigue and poor judgment    Associated symptoms: no abdominal pain, no chest pain, no headaches and no insomnia        Prior to Admission Medications   Prescriptions Last Dose Informant Patient Reported? Taking? ALPRAZolam (XANAX) 0 25 mg tablet   Yes Yes   Sig: Take 0 25 mg by mouth 2 (two) times a day Plus once prn   albuterol (PROVENTIL HFA,VENTOLIN HFA) 90 mcg/act inhaler   Yes Yes   Sig: Inhale 2 puffs every 6 (six) hours   amLODIPine (NORVASC) 10 mg tablet   Yes Yes   Sig: Take 10 mg by mouth daily   metoprolol tartrate (LOPRESSOR) 100 mg tablet   Yes Yes   Sig: Take 100 mg by mouth 2 (two) times a day   sertraline (ZOLOFT) 50 mg tablet   Yes Yes   Sig: Take 50 mg by mouth daily   simvastatin (ZOCOR) 10 mg tablet   Yes Yes   Sig: Take 10 mg by mouth daily at bedtime as needed   sodium chloride 1 g tablet   Yes Yes   Sig: Take 1 g by mouth 2 (two) times a day with meals      Facility-Administered Medications: None       Past Medical History:   Diagnosis Date    Anxiety     DM (diabetes mellitus) ([facility] [address] )     Hypertension        Past Surgical History:   Procedure Laterality Date    CARDIAC SURGERY      Bypass    HIP FRACTURE SURGERY Right     HYSTERECTOMY      NO PAST SURGERIES         History reviewed  No pertinent family history  I have reviewed and agree with the history as documented  Social History   Substance Use Topics    Smoking status: Never Smoker    Smokeless tobacco: Never Used    Alcohol use No        Review of Systems   Constitutional: Positive for fatigue  Negative for chills and fever  HENT: Negative for dental problem  Eyes: Negative for visual disturbance  Respiratory: Negative for shortness of breath  Cardiovascular: Negative for chest pain  Gastrointestinal: Negative for abdominal pain, diarrhea and vomiting  Genitourinary: Negative for dysuria and frequency  Musculoskeletal: Negative for arthralgias  Skin: Negative for rash  Neurological: Negative for dizziness, weakness, light-headedness and headaches     Psychiatric/Behavioral: Negative for agitation, behavioral problems and confusion  The patient is nervous/anxious  The patient does not have insomnia  All other systems reviewed and are negative  Physical Exam  Physical Exam   Constitutional: She is oriented to person, place, and time  She appears well-developed and well-nourished  HENT:   Head: Normocephalic and atraumatic  Eyes: Pupils are equal, round, and reactive to light  EOM are normal    Neck: Normal range of motion  Cardiovascular: Normal rate, regular rhythm and normal heart sounds  Pulmonary/Chest: Effort normal and breath sounds normal    Abdominal: Soft  Musculoskeletal: Normal range of motion  Neurological: She is alert and oriented to person, place, and time  Skin: Skin is warm and dry  Psychiatric: She has a normal mood and affect  Her behavior is normal  Thought content normal  She expresses no homicidal and no suicidal ideation  Nursing note and vitals reviewed        Vital Signs  ED Triage Vitals [11/02/18 0949]   Temperature Pulse Respirations Blood Pressure SpO2   98 4 °F (36 9 °C) 71 20 (!) 197/79 98 %      Temp Source Heart Rate Source Patient Position - Orthostatic VS BP Location FiO2 (%)   Oral Monitor Lying Left arm --      Pain Score       No Pain           Vitals:    11/02/18 0949 11/02/18 1130 11/02/18 1342 11/02/18 1636   BP: (!) 197/79 161/72 (!) 178/71 157/70   Pulse: 71 67 77 83   Patient Position - Orthostatic VS: Lying Sitting Lying Sitting       Visual Acuity      ED Medications  Medications - No data to display    Diagnostic Studies  Results Reviewed     Procedure Component Value Units Date/Time    Urine Microscopic [[id number]]  (Abnormal) Collected:  11/02/18 1111    Lab Status:  Final result Specimen:  Urine from Urine, Clean Catch Updated:  11/02/18 1149     RBC, UA None Seen /hpf      WBC, UA 1-2 (A) /hpf      Epithelial Cells Occasional /hpf      Bacteria, UA Occasional /hpf     Rapid drug screen, urine [[id number]]  (Abnormal) Collected:  11/02/18 1103    Lab Status:  Final result Specimen:  Urine from Urine, Clean Catch Updated:  11/02/18 1144     Amph/Meth UR Negative     Barbiturate Ur Negative     Benzodiazepine Urine Positive (A)     Cocaine Urine Negative     Methadone Urine Negative     Opiate Urine Negative     PCP Ur Negative     THC Urine Negative    Narrative:         Presumptive report  If requested, specimen will be sent to reference lab for confirmation  FOR MEDICAL PURPOSES ONLY  IF CONFIRMATION NEEDED PLEASE CONTACT THE LAB WITHIN 5 DAYS  Drug Screen Cutoff Levels:  AMPHETAMINE/METHAMPHETAMINES  1000 ng/mL  BARBITURATES     200 ng/mL  BENZODIAZEPINES     200 ng/mL  COCAINE      300 ng/mL  METHADONE      300 ng/mL  OPIATES      300 ng/mL  PHENCYCLIDINE     25 ng/mL  THC       50 ng/mL    POCT alcohol breath test [[id number]]  (Normal) Resulted:  11/02/18 1143    Lab Status:  Final result Updated:  11/02/18 1143     EXTBreath Alcohol 0 00    Comprehensive metabolic panel [[id number]]  (Abnormal) Collected:  11/02/18 1026    Lab Status:  Final result Specimen:  Blood from Arm, Left Updated:  11/02/18 1105     Sodium 131 (L) mmol/L      Potassium 4 0 mmol/L      Chloride 96 (L) mmol/L      CO2 30 mmol/L      ANION GAP 5 mmol/L      BUN 13 mg/dL      Creatinine 0 74 mg/dL      Glucose 148 (H) mg/dL      Calcium 9 0 mg/dL      AST 15 U/L      ALT 19 U/L      Alkaline Phosphatase 65 U/L      Total Protein 6 9 g/dL      Albumin 3 7 g/dL      Total Bilirubin 0 99 mg/dL      eGFR 74 ml/min/1 73sq m     Narrative:         National Kidney Disease Education Program recommendations are as follows:  GFR calculation is accurate only with a steady state creatinine  Chronic Kidney disease less than 60 ml/min/1 73 sq  meters  Kidney failure less than 15 ml/min/1 73 sq  meters      TSH [[id number]]  (Normal) Collected:  11/02/18 1026    Lab Status:  Final result Specimen:  Blood from Arm, Left Updated:  11/02/18 1105     TSH 3RD Tyler Salena 1 430 uIU/mL     Narrative:         Patients undergoing fluorescein dye angiography may retain small amounts of fluorescein in the body for 48-72 hours post procedure  Samples containing fluorescein can produce falsely depressed TSH values  If the patient had this procedure,a specimen should be resubmitted post fluorescein clearance            The recommended reference ranges for TSH during pregnancy are as follows:  First trimester 0 1 to 2 5 uIU/mL  Second trimester  0 2 to 3 0 uIU/mL  Third trimester 0 3 to 3 0 uIU/m      POCT urinalysis dipstick [[id number]]  (Abnormal) Resulted:  11/02/18 1102    Lab Status:  Final result Specimen:  Urine from Urine, Other Updated:  11/02/18 1102    ED Urine Macroscopic [[id number]]  (Abnormal) Collected:  11/02/18 1111    Lab Status:  Final result Specimen:  Urine Updated:  11/02/18 1058     Color, UA Yellow     Clarity, UA Clear     pH, UA 7 5     Leukocytes, UA Trace (A)     Nitrite, UA Negative     Protein, UA Negative mg/dl      Glucose, UA Negative mg/dl      Ketones, UA Negative mg/dl      Urobilinogen, UA 0 2 E U /dl      Bilirubin, UA Negative     Blood, UA Negative     Specific Gravity, UA 1 015    Narrative:       CLINITEK RESULT    CBC and differential [[id number]]  (Abnormal) Collected:  11/02/18 1026    Lab Status:  Final result Specimen:  Blood from Arm, Left Updated:  11/02/18 1037     WBC 5 34 Thousand/uL      RBC 3 76 (L) Million/uL      Hemoglobin 11 6 g/dL      Hematocrit 35 2 %      MCV 94 fL      MCH 30 9 pg      MCHC 33 0 g/dL      RDW 12 7 %      MPV 8 8 (L) fL      Platelets 639 Thousands/uL      nRBC 0 /100 WBCs      Neutrophils Relative 62 %      Immat GRANS % 0 %      Lymphocytes Relative 26 %      Monocytes Relative 9 %      Eosinophils Relative 2 %      Basophils Relative 1 %      Neutrophils Absolute 3 31 Thousands/µL      Immature Grans Absolute 0 02 Thousand/uL      Lymphocytes Absolute 1 38 Thousands/µL      Monocytes Absolute 0 47 Thousand/µL Eosinophils Absolute 0 13 Thousand/µL      Basophils Absolute 0 03 Thousands/µL                  No orders to display              Procedures  ECG 12 Lead Documentation  Date/Time: 11/2/2018 10:06 AM  Performed by: Sylvester Sims  Authorized by: Reshma BENNETT     ECG reviewed by me, the ED Provider: yes    Patient location:  ED  Previous ECG:     Previous ECG:  Compared to current    Comparison ECG info:  February 2018  Interpretation:     Interpretation: normal    Rate:     ECG rate:  61    ECG rate assessment: normal    Rhythm:     Rhythm: sinus rhythm    QRS:     QRS axis:  Normal    QRS intervals:  Normal  Conduction:     Conduction: abnormal      Abnormal conduction: complete RBBB    ST segments:     ST segments:  Normal  T waves:     T waves: normal             Phone Contacts  ED Phone Contact    ED Course  ED Course as of Nov 02 1817 Fri Nov 02, 2018   1234 Spoke with crisis worker after she had discussion with pt  States pt has adamantly denied wanting to go to a psychiatric facility  [phone] with ED SW, states he will speak with friend, Devin Hernandez, to keep her informed  Pt wishes to leave now  MDM  Number of Diagnoses or Management Options  Anxiety: new and requires workup  Diagnosis management comments: 1  Anxiety - This has been an ongoing and worsening issue with the pt  She has made multiple visits to the ED for this  She is working with [facility] for possible placement into assisted living but she has a significant disturbance in her life due to this anxiety  Feel pt may benefit from psychiatric stabilization  Will check electrolytes as pt has a known Hx of hyponatremia  After evaluation by crisis worker and  pt states she has no desire to go to a psychiatric facility  She just wants to go home  Notes she did not wish to come to the ED, only wants to go to [facility] as she has services and established care through them   Pt does not meet criteria for involuntary commitment, will discharge  Amount and/or Complexity of Data Reviewed  Clinical lab tests: ordered and reviewed  Review and summarize past medical records: yes  Discuss the patient with other providers: yes    Patient Progress  Patient progress: stable    CritCare Time    Disposition  Final diagnoses:   Anxiety     Time reflects when diagnosis was documented in both MDM as applicable and the Disposition within this note     Time User Action Codes Description Comment    11/2/2018  1:44 PM Kannan Jackson [F41 9] Anxiety       ED Disposition     ED Disposition Condition Comment    Discharge  Lucky Mortimer discharge to home/self care  Condition at discharge: Stable        MD Documentation      Most Recent Value   Sending MD Dr Deloris Fothergill    None         Discharge Medication List as of 11/2/2018  1:45 PM      CONTINUE these medications which have NOT CHANGED    Details   albuterol (PROVENTIL HFA,VENTOLIN HFA) 90 mcg/act inhaler Inhale 2 puffs every 6 (six) hours, Starting Wed 1/31/2018, Until Thu 1/31/2019, Historical Med      ALPRAZolam (XANAX) 0 25 mg tablet Take 0 25 mg by mouth 2 (two) times a day Plus once prn, Starting Fri 2/2/2018, Historical Med      amLODIPine (NORVASC) 10 mg tablet Take 10 mg by mouth daily, Starting Wed 9/13/2017, Historical Med      metoprolol tartrate (LOPRESSOR) 100 mg tablet Take 100 mg by mouth 2 (two) times a day, Starting Wed 9/13/2017, Historical Med      sertraline (ZOLOFT) 50 mg tablet Take 50 mg by mouth daily, Starting Wed 9/13/2017, Historical Med      simvastatin (ZOCOR) 10 mg tablet Take 10 mg by mouth daily at bedtime as needed, Starting Wed 9/13/2017, Historical Med      sodium chloride 1 g tablet Take 1 g by mouth 2 (two) times a day with meals, Historical Med           No discharge procedures on file      ED Provider  Electronically Signed by           Pamela Palma MD  11/02/18 0989

## 2018-11-02 NOTE — ED NOTES
Pt reports she has a lot of anxiety and doesnt want to be alone  She denies suicidal and homicidal ideations and denies auditory and visual hallucinations  Pt does not want to be admitted to the hospital however states her xanax isnt working  She feels she may need to be in assisted leaving  Will consult with social work to see if there is any possibilities for referrals

## 2018-11-02 NOTE — ED NOTES
Called [facility] pharmacy on [address]  To verify medication list  Per [facility] pharmacist, patient filled her Amlodipine and Metoprolol July 6th for a 90 day supply  Spoke with patient who states she believes she is still on her BP medication        Rupesh Petit RN  11/02/18 4853

## 2018-11-02 NOTE — ED NOTES
Spoke with Sammi roca who reported she is unable to get the patient until 1700  Charge  [facility] aware        Jennifer Corral RN  11/02/18 2049

## 2018-11-04 LAB — GLUCOSE SERPL-MCNC: 240 MG/DL (ref 65–140)

## 2018-11-23 ENCOUNTER — HOSPITAL ENCOUNTER (EMERGENCY)
Facility: HOSPITAL | Age: 83
Discharge: HOME | End: 2018-11-23
Attending: EMERGENCY MEDICINE | Admitting: EMERGENCY MEDICINE
Payer: COMMERCIAL

## 2018-11-23 VITALS
DIASTOLIC BLOOD PRESSURE: 90 MMHG | RESPIRATION RATE: 16 BRPM | SYSTOLIC BLOOD PRESSURE: 137 MMHG | OXYGEN SATURATION: 97 % | HEART RATE: 59 BPM | TEMPERATURE: 98.6 F

## 2018-11-23 DIAGNOSIS — F41.9 ANXIETY: Primary | ICD-10-CM

## 2018-11-23 LAB
ANION GAP BLD CALC-SCNC: 17 MMOL/L (ref 4–13)
BACTERIA UR QL AUTO: ABNORMAL /HPF
BILIRUB UR QL STRIP: NEGATIVE
BUN BLD-MCNC: 19 MG/DL (ref 5–25)
CA-I BLD-SCNC: 1.12 MMOL/L (ref 1.12–1.32)
CHLORIDE BLD-SCNC: 91 MMOL/L (ref 100–108)
CLARITY UR: ABNORMAL
COLOR UR: YELLOW
CREAT BLD-MCNC: 0.7 MG/DL (ref 0.6–1.3)
GFR SERPL CREATININE-BSD FRML MDRD: 79 ML/MIN/1.73SQ M
GLUCOSE SERPL-MCNC: 107 MG/DL (ref 65–140)
GLUCOSE UR STRIP-MCNC: NEGATIVE MG/DL
HCT VFR BLD CALC: 34 % (ref 34.8–46.1)
HGB BLDA-MCNC: 11.6 G/DL (ref 11.5–15.4)
HGB UR QL STRIP.AUTO: NEGATIVE
KETONES UR STRIP-MCNC: NEGATIVE MG/DL
LEUKOCYTE ESTERASE UR QL STRIP: ABNORMAL
NITRITE UR QL STRIP: NEGATIVE
NON-SQ EPI CELLS URNS QL MICRO: ABNORMAL /HPF
PCO2 BLD: 29 MMOL/L (ref 21–32)
PH UR STRIP.AUTO: 7 [PH] (ref 4.5–8)
POTASSIUM BLD-SCNC: 4.4 MMOL/L (ref 3.5–5.3)
PROT UR STRIP-MCNC: NEGATIVE MG/DL
RBC #/AREA URNS AUTO: ABNORMAL /HPF
SODIUM BLD-SCNC: 131 MMOL/L (ref 136–145)
SP GR UR STRIP.AUTO: 1.01 (ref 1–1.03)
SPECIMEN SOURCE: ABNORMAL
UROBILINOGEN UR QL STRIP.AUTO: 0.2 E.U./DL
WBC #/AREA URNS AUTO: ABNORMAL /HPF

## 2018-11-23 PROCEDURE — 81001 URINALYSIS AUTO W/SCOPE: CPT

## 2018-11-23 PROCEDURE — 85014 HEMATOCRIT: CPT

## 2018-11-23 PROCEDURE — 99283 EMERGENCY DEPT VISIT LOW MDM: CPT

## 2018-11-23 PROCEDURE — 80047 BASIC METABLC PNL IONIZED CA: CPT

## 2018-11-23 RX ORDER — ACETAMINOPHEN 325 MG/1
650 TABLET ORAL ONCE
Status: COMPLETED | OUTPATIENT
Start: 2018-11-23 | End: 2018-11-23

## 2018-11-23 RX ORDER — HYDROXYZINE HYDROCHLORIDE 25 MG/1
25 TABLET, FILM COATED ORAL ONCE
Status: COMPLETED | OUTPATIENT
Start: 2018-11-23 | End: 2018-11-23

## 2018-11-23 RX ADMIN — HYDROXYZINE HYDROCHLORIDE 25 MG: 25 TABLET ORAL at 15:16

## 2018-11-23 RX ADMIN — ACETAMINOPHEN 650 MG: 325 TABLET, FILM COATED ORAL at 15:16

## 2018-11-23 NOTE — ED PROVIDER NOTES
History  Chief Complaint   Patient presents with    Anxiety     Per EMS report pt is well known and calls EMS multiple times a week due to feeling anxious and lonely  Pt reports has been feeling very anxious and mentions she "has no neighbors or anyone around to take care of her " Pt reports continuing to feel anxious even after taking her medication  Pt denies SI/HI/AH/VH  Pt states " I just feel like I have no strength today "     Patient presents here for anxiety  Patient states she took her Xanax this morning without relief  Patient complains of pain from her head to her toes   Pt complains of fatigue  Symptoms have been present for a "few hours " Patient says I am just wreck   Pt complains that she is all by herself, there is no one to take care of her, only one niece that lives an hour  Pt is upset that she is supposed to "be in a home" and keeps getting sick so unable to go  Pt denies SI/HI, says "I would never do that "    Xanax last refilled 11/14/2018 by [facility]  Last seen 11/02/18 for similar symptoms  Prior to Admission Medications   Prescriptions Last Dose Informant Patient Reported? Taking?    ALPRAZolam (XANAX) 0 25 mg tablet   Yes No   Sig: Take 0 25 mg by mouth 2 (two) times a day Plus once prn   albuterol (PROVENTIL HFA,VENTOLIN HFA) 90 mcg/act inhaler   Yes No   Sig: Inhale 2 puffs every 6 (six) hours   amLODIPine (NORVASC) 10 mg tablet   Yes No   Sig: Take 10 mg by mouth daily   metoprolol tartrate (LOPRESSOR) 100 mg tablet   Yes No   Sig: Take 100 mg by mouth 2 (two) times a day   sertraline (ZOLOFT) 50 mg tablet   Yes No   Sig: Take 50 mg by mouth daily   simvastatin (ZOCOR) 10 mg tablet   Yes No   Sig: Take 10 mg by mouth daily at bedtime as needed   sodium chloride 1 g tablet   Yes No   Sig: Take 1 g by mouth 2 (two) times a day with meals      Facility-Administered Medications: None       Past Medical History:   Diagnosis Date    Anxiety     [facility]  Hyperlipidemia     Hypertension        Past Surgical History:   Procedure Laterality Date    CARDIAC SURGERY      Bypass    HIP FRACTURE SURGERY Right     HYSTERECTOMY      NO PAST SURGERIES         History reviewed  No pertinent family history  I have reviewed and agree with the history as documented  Social History   Substance Use Topics    Smoking status: Never Smoker    Smokeless tobacco: Never Used    Alcohol use No        Review of Systems   Constitutional: Positive for fatigue  Negative for fever  HENT: Negative for congestion  Respiratory: Negative for cough, shortness of breath and wheezing  Cardiovascular: Negative for chest pain and palpitations  Gastrointestinal: Negative for abdominal pain, nausea and vomiting  Psychiatric/Behavioral: Negative for confusion, self-injury and suicidal ideas  Physical Exam  ED Triage Vitals   Temperature Pulse Respirations Blood Pressure SpO2   11/23/18 1423 11/23/18 1423 11/23/18 1423 11/23/18 1423 11/23/18 1423   98 6 °F (37 °C) 61 16 168/71 98 %      Temp Source Heart Rate Source Patient Position - Orthostatic VS BP Location FiO2 (%)   11/23/18 1423 11/23/18 1423 11/23/18 1622 11/23/18 1423 --   Oral Monitor Lying Right arm       Pain Score       11/23/18 1423       No Pain           Orthostatic Vital Signs  Vitals:    11/23/18 1423 11/23/18 1622   BP: 168/71 137/90   Pulse: 61 59   Patient Position - Orthostatic VS:  Lying       Physical Exam   Constitutional: She is oriented to person, place, and time  She appears well-developed and well-nourished  No distress  HENT:   Head: Normocephalic and atraumatic  Mouth/Throat: Oropharynx is clear and moist  No oropharyngeal exudate  Eyes: Conjunctivae and EOM are normal  Right eye exhibits no discharge  Left eye exhibits no discharge  Neck: Normal range of motion  Cardiovascular: Normal rate, regular rhythm and normal heart sounds      Pulmonary/Chest: Effort normal and breath sounds normal  No respiratory distress  She has no wheezes  Abdominal: Soft  Bowel sounds are normal  She exhibits no distension  There is no tenderness  There is no guarding  Neurological: She is alert and oriented to person, place, and time  Skin: Skin is warm and dry  Capillary refill takes less than 2 seconds  She is not diaphoretic  Psychiatric:   Pt anxious, becomes tearful when discussing how lonely she feels         ED Medications  Medications   hydrOXYzine HCL (ATARAX) tablet 25 mg (25 mg Oral Given 11/23/18 1516)   acetaminophen (TYLENOL) tablet 650 mg (650 mg Oral Given 11/23/18 1516)       Diagnostic Studies  Results Reviewed     Procedure Component Value Units Date/Time    Urine Microscopic [[id number]]  (Abnormal) Collected:  11/23/18 1646    Lab Status:  Final result Specimen:  Urine from Urine, Clean Catch Updated:  11/23/18 1656     RBC, UA None Seen /hpf      WBC, UA 4-10 (A) /hpf      Epithelial Cells Occasional /hpf      Bacteria, UA None Seen /hpf     ED Urine Macroscopic [[id number]]  (Abnormal) Collected:  11/23/18 1646    Lab Status:  Final result Specimen:  Urine Updated:  11/23/18 1632     Color, UA Yellow     Clarity, UA Slightly Cloudy     pH, UA 7 0     Leukocytes, UA Small (A)     Nitrite, UA Negative     Protein, UA Negative mg/dl      Glucose, UA Negative mg/dl      Ketones, UA Negative mg/dl      Urobilinogen, UA 0 2 E U /dl      Bilirubin, UA Negative     Blood, UA Negative     Specific Gravity, UA 1 015    Narrative:       CLINITEK RESULT    POCT Chem 8+ [[id number]]  (Abnormal) Collected:  11/23/18 1601    Lab Status:  Final result Updated:  11/23/18 1607     SODIUM, I-STAT 131 (L) mmol/l      Potassium, i-STAT 4 4 mmol/L      Chloride, istat 91 (L) mmol/L      CO2, i-STAT 29 mmol/L      Anion Gap, i-STAT 17 (H) mmol/L      Calcium, Ionized i-STAT 1 12 mmol/L      BUN, I-STAT 19 mg/dl      Creatinine, i-STAT 0 7 mg/dl      eGFR 79 ml/min/1 73sq m      Glucose, i-STAT 107 mg/dl      Hct, i-STAT 34 (L) %      Hgb, i-STAT 11 6 g/dl      Specimen Type VENOUS                 No orders to display         Procedures  Procedures      Phone Consults  ED Phone Contact    ED Course  ED Course as of Nov 23 1705 Fri Nov 23, 2018   1425 Pt examined at bedside  Pt is anxious  Complains of pain  Will give Atarax and tylenol                                 MDM  Number of Diagnoses or Management Options  Anxiety: new and requires workup  Diagnosis management comments: Patient has a long history of anxiety, however this is an acute exacerbation unresponsive to Xanax  Patient has multiple stressors, mostly feelings of loneliness denies SI/HI  Anxiety attributed to stressors/emotional state, no concerns for self-harm  Due to complaints of generalized weakness and "head to toe" pain i-STAT Chem 8 ordered, WNL   UA normal  Patient stable for discharge, glucose elevated to 240 advised to follow up with PCP       Amount and/or Complexity of Data Reviewed  Clinical lab tests: ordered and reviewed      CritCare Time    Disposition  Final diagnoses:   Anxiety     Time reflects when diagnosis was documented in both MDM as applicable and the Disposition within this note     Time User Action Codes Description Comment    11/23/2018  4:59 PM Dustin Barajas Add [F41 9] Anxiety       ED Disposition     ED Disposition Condition Comment    Discharge  Rosa Maria Araiza discharge to home/self care  Condition at discharge: Stable        Follow-up Information    None         Patient's Medications   Discharge Prescriptions    No medications on file     No discharge procedures on file  ED Provider  Attending physically available and evaluated Rosa Maria Araiza I managed the patient along with the ED Attending      Electronically Signed by         Sharron Morris MD  11/26/18 6749

## 2018-11-23 NOTE — ED NOTES
Faustino crackers and juice provided to patient   [facility] (patients ride) contacted, enroute to hospital ETA 35 min     [facility]  11/23/18 8588

## 2018-11-23 NOTE — DISCHARGE INSTRUCTIONS
Follow up with your PCP and discuss anxiety & blood glucose  Anxiety   WHAT YOU SHOULD KNOW:   Anxiety is a condition that causes you to feel excessive worry, uneasiness, or fear  Family or work stress, smoking, caffeine, and alcohol can increase your risk for anxiety  Certain medicines or health conditions can also increase your risk  Anxiety may begin gradually, and can become a long-term condition if it is not managed or treated  AFTER YOU LEAVE:   Medicines:   · Medicines  can help you feel more calm and relaxed, and decrease your symptoms  · Take your medicine as directed  Contact your healthcare provider if you think your medicine is not helping or if you have side effects  Tell him if you are allergic to any medicine  Keep a list of the medicines, vitamins, and herbs you take  Include the amounts, and when and why you take them  Bring the list or the pill bottles to follow-up visits  Carry your medicine list with you in case of an emergency  Follow up with your healthcare provider within 2 weeks or as directed:  Write down your questions so you remember to ask them during your visits  Manage anxiety:   · Go to counseling as directed  Cognitive behavioral therapy can help you understand and change how you react to events that trigger your symptoms  · Find ways to manage your symptoms  Activities such as exercise, meditation, or listening to music can help you relax  · Practice deep breathing  Breathing can change how your body reacts to stress  Focus on taking slow, deep breaths several times a day, or during an anxiety attack  Breathe in through your nose, and out through your mouth  · Avoid caffeine  Caffeine can make your symptoms worse  Avoid foods or drinks that are meant to increase your energy level  · Limit or avoid alcohol  Ask your healthcare provider if alcohol is safe for you  You may not be able to drink alcohol if you take certain anxiety or depression medicines   Limit alcohol to 1 drink per day if you are a woman  Limit alcohol to 2 drinks per day if you are a man  A drink of alcohol is 12 ounces of beer, 5 ounces of wine, or 1½ ounces of liquor  Contact your healthcare provider if:   · Your symptoms get worse or do not get better with treatment  · You think your medicine may be causing side effects  · Your anxiety keeps you from doing your regular daily activities  · You have new symptoms since your last visit  · You have questions or concerns about your condition or care  Seek care immediately or call 911 if:   · You have chest pain, tightness, or heaviness that may spread to your shoulders, arms, jaw, neck, or back  · You feel like hurting yourself or someone else  · You feel dizzy, lightheaded, or faint  © 2014 [employer] [employer] is for End User's use only and may not be sold, redistributed or otherwise used for commercial purposes  All illustrations and images included in CareNotes® are the copyrighted property of A [employer] M [employer]  or Darvin Trinidad  The above information is an  only  It is not intended as medical advice for individual conditions or treatments  Talk to your doctor, nurse or pharmacist before following any medical regimen to see if it is safe and effective for you

## 2018-12-16 ENCOUNTER — HOSPITAL ENCOUNTER (EMERGENCY)
Facility: HOSPITAL | Age: 83
Discharge: HOME | End: 2018-12-16
Attending: EMERGENCY MEDICINE | Admitting: EMERGENCY MEDICINE
Payer: COMMERCIAL

## 2018-12-16 VITALS
HEART RATE: 69 BPM | BODY MASS INDEX: 21.26 KG/M2 | TEMPERATURE: 98 F | DIASTOLIC BLOOD PRESSURE: 70 MMHG | WEIGHT: 120 LBS | OXYGEN SATURATION: 100 % | RESPIRATION RATE: 18 BRPM | SYSTOLIC BLOOD PRESSURE: 190 MMHG

## 2018-12-16 DIAGNOSIS — F41.9 ANXIETY: Primary | ICD-10-CM

## 2018-12-16 PROCEDURE — 99284 EMERGENCY DEPT VISIT MOD MDM: CPT

## 2018-12-16 RX ORDER — ACETAMINOPHEN 325 MG/1
650 TABLET ORAL ONCE
Status: COMPLETED | OUTPATIENT
Start: 2018-12-16 | End: 2018-12-16

## 2018-12-16 RX ADMIN — ACETAMINOPHEN 650 MG: 325 TABLET, FILM COATED ORAL at 15:18

## 2018-12-16 NOTE — ED PROVIDER NOTES
History  Chief Complaint   Patient presents with    Anxiety     Pt states took "anxiety pill at at 0520 and 1030 this morning, and tylenol at 11:30" Complains of pain in stomach  Denies N/V/D, chest pain, SOB   Abdominal Pain     79 YO female presents with anxiety and abdominal discomfort  Pt states symptoms have been gradually worsening over the last few days  States this is due to recently losing her phone; she can call out but cannot receive phone calls  Pt states she is to have the telephone company in tomorrow to evaluate this further  She has a known Hx of anxiety which has worsened since losing driving privileges  She has multiple presentations to [facility] for similar episodes of anxiety and abdominal discomfort, she is working closely with social work to help with this  Assisted living has been discussed and pt states she did visit one facility but has not followed up on this  Pt denies new or different symptoms, she tried taking Xanax twice this morning and took Acetaminophen ~3 5 hours PTA  States these medications did not help and pt is not supposed to take more Xanax until tonight  Pt states the abdominal pain is general, constant, this is similar to previous episodes of anxiety  Pt denies CP/SOB/F/C/N/V/D/C, no dysuria, burning on urination or blood in urine  History provided by:  Patient and medical records   used: No    Anxiety   Presenting symptoms: no agitation, no suicidal thoughts and no suicidal threats    Degree of incapacity (severity):   Moderate  Onset quality:  Gradual  Duration:  5 hours  Timing:  Constant  Progression:  Waxing and waning  Chronicity:  Chronic  Relieved by:  Nothing  Worsened by:  Nothing  Associated symptoms: abdominal pain    Associated symptoms: no anxiety, no chest pain, no fatigue, no headaches, no insomnia, no poor judgment and no weight change    Abdominal pain:     Location:  Generalized    Quality: aching      Severity:  Mild    Onset quality:  Gradual    Duration:  5 hours    Timing:  Constant    Progression:  Waxing and waning    Chronicity:  New  Abdominal Pain   Associated symptoms: no chest pain, no chills, no diarrhea, no dysuria, no fatigue, no fever, no shortness of breath and no vomiting        Prior to Admission Medications   Prescriptions Last Dose Informant Patient Reported? Taking? ALPRAZolam (XANAX) 0 25 mg tablet   Yes Yes   Sig: Take 0 25 mg by mouth 2 (two) times a day Plus once prn   amLODIPine (NORVASC) 10 mg tablet   Yes Yes   Sig: Take 10 mg by mouth daily   metoprolol tartrate (LOPRESSOR) 100 mg tablet   Yes Yes   Sig: Take 100 mg by mouth 2 (two) times a day   sertraline (ZOLOFT) 50 mg tablet   Yes Yes   Sig: Take 50 mg by mouth daily   simvastatin (ZOCOR) 10 mg tablet   Yes Yes   Sig: Take 10 mg by mouth daily at bedtime as needed   sodium chloride 1 g tablet   Yes Yes   Sig: Take 1 g by mouth 2 (two) times a day with meals      Facility-Administered Medications: None       Past Medical History:   Diagnosis Date    Anxiety     [facility]     Hyperlipidemia     Hypertension        Past Surgical History:   Procedure Laterality Date    CARDIAC SURGERY      Bypass    HIP FRACTURE SURGERY Right     HYSTERECTOMY      NO PAST SURGERIES         History reviewed  No pertinent family history  I have reviewed and agree with the history as documented  Social History   Substance Use Topics    Smoking status: Never Smoker    Smokeless tobacco: Never Used    Alcohol use No        Review of Systems   Constitutional: Negative for chills, fatigue and fever  HENT: Negative for dental problem  Eyes: Negative for visual disturbance  Respiratory: Negative for shortness of breath  Cardiovascular: Negative for chest pain  Gastrointestinal: Positive for abdominal pain  Negative for diarrhea and vomiting  Genitourinary: Negative for dysuria and frequency  Musculoskeletal: Negative for arthralgias  Skin: Negative for rash  Neurological: Negative for dizziness, weakness, light-headedness and headaches  Psychiatric/Behavioral: Negative for agitation, behavioral problems, confusion and suicidal ideas  The patient is not nervous/anxious and does not have insomnia  All other systems reviewed and are negative  Physical Exam  Physical Exam   Constitutional: She is oriented to person, place, and time  She appears well-developed and well-nourished  HENT:   Head: Normocephalic and atraumatic  Eyes: EOM are normal    Neck: Normal range of motion  Cardiovascular: Normal rate, regular rhythm and normal heart sounds  Pulmonary/Chest: Effort normal and breath sounds normal    Abdominal: Soft  There is no tenderness  Musculoskeletal: Normal range of motion  Neurological: She is alert and oriented to person, place, and time  Skin: Skin is warm and dry  Psychiatric: She has a normal mood and affect  Her behavior is normal  Thought content normal    Nursing note and vitals reviewed  Vital Signs  ED Triage Vitals [12/16/18 1442]   Temperature Pulse Respirations Blood Pressure SpO2   98 °F (36 7 °C) 69 18 (!) 190/70 100 %      Temp Source Heart Rate Source Patient Position - Orthostatic VS BP Location FiO2 (%)   Oral Monitor Sitting Right arm --      Pain Score       No Pain           Vitals:    12/16/18 1442   BP: (!) 190/70   Pulse: 69   Patient Position - Orthostatic VS: Sitting       Visual Acuity      ED Medications  Medications   acetaminophen (TYLENOL) tablet 650 mg (650 mg Oral Given 12/16/18 1518)       Diagnostic Studies  Results Reviewed     None                 No orders to display              Procedures  Procedures       Phone Contacts  ED Phone Contact    ED Course                               MDM  Number of Diagnoses or Management Options  Anxiety: new and requires workup  Diagnosis management comments: 1   Anxiety - Pt with Hx of similar, seen multiple times for this in the past, denies new or different symptoms  Pt with abdominal pain which is typical of her usual anxiety  Currently she does not wish to have any testing performed, states she would just like to leave  Will give acetaminophen, have given strict return instructions for any new or different symptoms  Amount and/or Complexity of Data Reviewed  Review and summarize past medical records: yes    Patient Progress  Patient progress: stable    CritCare Time    Disposition  Final diagnoses:   Anxiety     Time reflects when diagnosis was documented in both MDM as applicable and the Disposition within this note     Time User Action Codes Description Comment    12/16/2018  3:14 PM Amita Ranch E Add [F41 9] Anxiety       ED Disposition     ED Disposition Condition Comment    Discharge  Danita Funez discharge to home/self care      Condition at discharge: Stable        Follow-up Information     Follow up With Specialties Details Why [address] [facility] [address] Family Medicine Schedule an appointment as soon as possible for a visit  Joanne [address] [phone]            Discharge Medication List as of 12/16/2018  3:16 PM      CONTINUE these medications which have NOT CHANGED    Details   ALPRAZolam (XANAX) 0 25 mg tablet Take 0 25 mg by mouth 2 (two) times a day Plus once prn, Starting Fri 2/2/2018, Historical Med      amLODIPine (NORVASC) 10 mg tablet Take 10 mg by mouth daily, Starting Wed 9/13/2017, Historical Med      metoprolol tartrate (LOPRESSOR) 100 mg tablet Take 100 mg by mouth 2 (two) times a day, Starting Wed 9/13/2017, Historical Med      sertraline (ZOLOFT) 50 mg tablet Take 50 mg by mouth daily, Starting Wed 9/13/2017, Historical Med      simvastatin (ZOCOR) 10 mg tablet Take 10 mg by mouth daily at bedtime as needed, Starting Wed 9/13/2017, Historical Med      sodium chloride 1 g tablet Take 1 g by mouth 2 (two) times a day with meals, Historical Med No discharge procedures on file      ED Provider  Electronically Signed by           Ivana Coffey MD  12/16/18 [id number]

## 2019-01-08 ENCOUNTER — HOSPITAL ENCOUNTER (EMERGENCY)
Facility: HOSPITAL | Age: 84
Discharge: HOME | End: 2019-01-08
Attending: EMERGENCY MEDICINE
Payer: COMMERCIAL

## 2019-01-08 VITALS
BODY MASS INDEX: 22.26 KG/M2 | HEART RATE: 58 BPM | RESPIRATION RATE: 16 BRPM | SYSTOLIC BLOOD PRESSURE: 170 MMHG | TEMPERATURE: 97.6 F | WEIGHT: 125.66 LBS | OXYGEN SATURATION: 97 % | DIASTOLIC BLOOD PRESSURE: 77 MMHG

## 2019-01-08 DIAGNOSIS — R10.13 EPIGASTRIC ABDOMINAL PAIN: ICD-10-CM

## 2019-01-08 DIAGNOSIS — F41.9 ANXIETY: Primary | ICD-10-CM

## 2019-01-08 PROCEDURE — 99284 EMERGENCY DEPT VISIT MOD MDM: CPT

## 2019-01-08 RX ORDER — ACETAMINOPHEN 325 MG/1
650 TABLET ORAL ONCE
Status: COMPLETED | OUTPATIENT
Start: 2019-01-08 | End: 2019-01-08

## 2019-01-08 RX ADMIN — ACETAMINOPHEN 650 MG: 325 TABLET, FILM COATED ORAL at 21:16

## 2019-01-09 NOTE — DISCHARGE INSTRUCTIONS
Abdominal Pain   WHAT YOU NEED TO KNOW:   Abdominal pain can be dull, achy, or sharp  You may have pain in one area of your abdomen, or in your entire abdomen  Your pain may be caused by a condition such as constipation, food sensitivity or poisoning, infection, or a blockage  Abdominal pain can also be from a hernia, appendicitis, or an ulcer  Liver, gallbladder, or kidney conditions can also cause abdominal pain  The cause of your abdominal pain may be unknown  DISCHARGE INSTRUCTIONS:   Return to the emergency department if:   · You have new chest pain or shortness of breath  · You have pulsing pain in your upper abdomen or lower back that suddenly becomes constant  · Your pain is in the right lower abdominal area and worsens with movement  · You have a fever over 100 4°F (38°C) or shaking chills  · You are vomiting and cannot keep food or liquids down  · Your pain does not improve or gets worse over the next 8 to 12 hours  · You see blood in your vomit or bowel movements, or they look black and tarry  · Your skin or the whites of your eyes turn yellow  · You are a woman and have a large amount of vaginal bleeding that is not your monthly period  Contact your healthcare provider if:   · You have pain in your lower back  · You are a man and have pain in your testicles  · You have pain when you urinate  · You have questions or concerns about your condition or care  Follow up with your healthcare provider within 24 hours or as directed:  Write down your questions so you remember to ask them during your visits  Medicines:   · Medicines  may be given to calm your stomach and prevent vomiting or to decrease pain  Ask how to take pain medicine safely  · Take your medicine as directed  Contact your healthcare provider if you think your medicine is not helping or if you have side effects  Tell him of her if you are allergic to any medicine   Keep a list of the medicines, vitamins, and herbs you take  Include the amounts, and when and why you take them  Bring the list or the pill bottles to follow-up visits  Carry your medicine list with you in case of an emergency  © 2017 [employer] Information is for End User's use only and may not be sold, redistributed or otherwise used for commercial purposes  All illustrations and images included in CareNotes® are the copyrighted property of A D A M , Inc  or Darvin Trinidad  The above information is an  only  It is not intended as medical advice for individual conditions or treatments  Talk to your doctor, nurse or pharmacist before following any medical regimen to see if it is safe and effective for you  Anxiety   WHAT YOU NEED TO KNOW:   Anxiety is a condition that causes you to feel extremely worried or nervous  The feelings are so strong that they can cause problems with your daily activities or sleep  Anxiety may be triggered by something you fear, or it may happen without a cause  Family or work stress, smoking, caffeine, and alcohol can increase your risk for anxiety  Certain medicines or health conditions can also increase your risk  Anxiety can become a long-term condition if it is not managed or treated  DISCHARGE INSTRUCTIONS:   Call 911 if:   · You have chest pain, tightness, or heaviness that may spread to your shoulders, arms, jaw, neck, or back  · You feel like hurting yourself or someone else  Contact your healthcare provider if:   · Your symptoms get worse or do not get better with treatment  · Your anxiety keeps you from doing your regular daily activities  · You have new symptoms since your last visit  · You have questions or concerns about your condition or care  Medicines:   · Medicines  may be given to help you feel more calm and relaxed, and decrease your symptoms  · Take your medicine as directed    Contact your healthcare provider if you think your medicine is not helping or if you have side effects  Tell him of her if you are allergic to any medicine  Keep a list of the medicines, vitamins, and herbs you take  Include the amounts, and when and why you take them  Bring the list or the pill bottles to follow-up visits  Carry your medicine list with you in case of an emergency  Follow up with your healthcare provider within 2 weeks or as directed:  Write down your questions so you remember to ask them during your visits  Manage anxiety:   · Talk to someone about your anxiety  Your healthcare provider may suggest counseling  Cognitive behavioral therapy can help you understand and change how you react to events that trigger your symptoms  You might feel more comfortable talking with a friend or family member about your anxiety  Choose someone you know will be supportive and encouraging  · Find ways to relax  Activities such as exercise, meditation, or listening to music can help you relax  Spend time with friends, or do things you enjoy  · Practice deep breathing  Deep breathing can help you relax when you feel anxious  Focus on taking slow, deep breaths several times a day, or during an anxiety attack  Breathe in through your nose and out through your mouth  · Create a regular sleep routine  Regular sleep can help you feel calmer during the day  Go to sleep and wake up at the same times every day  Do not watch television or use the computer right before bed  Your room should be comfortable, dark, and quiet  · Eat a variety of healthy foods  Healthy foods include fruits, vegetables, low-fat dairy products, lean meats, fish, whole-grain breads, and cooked beans  Healthy foods can help you feel less anxious and have more energy  · Exercise regularly  Exercise can increase your energy level  Exercise may also lift your mood and help you sleep better  Your healthcare provider can help you create an exercise plan  · Do not smoke    Nicotine and other chemicals in cigarettes and cigars can increase anxiety  Ask your healthcare provider for information if you currently smoke and need help to quit  E-cigarettes or smokeless tobacco still contain nicotine  Talk to your healthcare provider before you use these products  · Do not have caffeine  Caffeine can make your symptoms worse  Do not have foods or drinks that are meant to increase your energy level  · Limit or do not drink alcohol  Ask your healthcare provider if alcohol is safe for you  You may not be able to drink alcohol if you take certain anxiety or depression medicines  Limit alcohol to 1 drink per day if you are a woman  Limit alcohol to 2 drinks per day if you are a man  A drink of alcohol is 12 ounces of beer, 5 ounces of wine, or 1½ ounces of liquor  · Do not use drugs  Drugs can make your anxiety worse  It can also make anxiety hard to manage  Talk to your healthcare provider if you use drugs and want help to quit  © 2017 [address] [employer] Information is for End User's use only and may not be sold, redistributed or otherwise used for commercial purposes  All illustrations and images included in CareNotes® are the copyrighted property of A [employer]  or Darvin Trinidad  The above information is an  only  It is not intended as medical advice for individual conditions or treatments  Talk to your doctor, nurse or pharmacist before following any medical regimen to see if it is safe and effective for you

## 2019-01-09 NOTE — ED PROVIDER NOTES
History  Chief Complaint   Patient presents with    Abdominal Pain     Pt reports mid quad abd pain "Today all day, I've been here before for this, you should have it in your computer  I'm not sure if its a pill I'm taking " Denies NDV  51-year-old female with a history of anxiety, hypertension presents to the emergency department with recurrence of her epigastric discomfort and nausea  Patient has been evaluated here multiple times and at [facility]   The most recent visit to [facility] for this complaint was 2 days ago  She had normal blood work  She did have a CT of her abdomen and pelvis which showed stable gallstones without cholecystitis and stable pancreatic cyst   Patient believes that her symptoms may be secondary to 1 of her medications for heartburn, on review of her med she is not on anything for gastritis  She has had no vomiting  She is unable to describe the upper abdominal pain other than feeling anxious  She was supposed to see her family doctor today regarding this complaint and did not go secondary to weather concerns  She also states she has never seen a gastroenterologist regarding these complaints  History provided by:  Patient   used: No    Abdominal Pain   Pain location:  Epigastric  Pain quality comment:  Unable to describe  Pain radiates to:  Does not radiate  Pain severity:  Unable to specify  Chronicity:  Recurrent  Context: previous surgery    Context: not alcohol use, not eating, not recent illness, not sick contacts and not trauma    Relieved by:  None tried  Exacerbated by: Anxiety    Ineffective treatments:  None tried  Associated symptoms: nausea    Associated symptoms: no anorexia, no belching, no chest pain, no chills, no constipation, no cough, no diarrhea, no dysuria, no fatigue, no fever, no flatus, no hematemesis, no hematochezia, no hematuria, no melena, no shortness of breath, no sore throat and no vomiting Risk factors: being elderly    Risk factors: no alcohol abuse, has not had multiple surgeries and not obese        Prior to Admission Medications   Prescriptions Last Dose Informant Patient Reported? Taking? ALPRAZolam (XANAX) 0 25 mg tablet   Yes No   Sig: Take 0 25 mg by mouth 2 (two) times a day Plus once prn   amLODIPine (NORVASC) 10 mg tablet   Yes No   Sig: Take 10 mg by mouth daily   metoprolol tartrate (LOPRESSOR) 100 mg tablet   Yes No   Sig: Take 100 mg by mouth 2 (two) times a day   sertraline (ZOLOFT) 50 mg tablet   Yes No   Sig: Take 50 mg by mouth daily   simvastatin (ZOCOR) 10 mg tablet   Yes No   Sig: Take 10 mg by mouth daily at bedtime as needed   sodium chloride 1 g tablet   Yes No   Sig: Take 1 g by mouth 2 (two) times a day with meals      Facility-Administered Medications: None       Past Medical History:   Diagnosis Date    Anxiety     DM (diabetes mellitus) ([facility] [address] )     Hyperlipidemia     Hypertension        Past Surgical History:   Procedure Laterality Date    CARDIAC SURGERY      Bypass    HIP FRACTURE SURGERY Right     HYSTERECTOMY      NO PAST SURGERIES         History reviewed  No pertinent family history  I have reviewed and agree with the history as documented  Social History   Substance Use Topics    Smoking status: Never Smoker    Smokeless tobacco: Never Used    Alcohol use No        Review of Systems   Constitutional: Negative  Negative for chills, diaphoresis, fatigue and fever  HENT: Negative  Negative for congestion, rhinorrhea and sore throat  Eyes: Negative  Negative for discharge, redness and itching  Respiratory: Negative  Negative for apnea, cough, chest tightness, shortness of breath and wheezing  Cardiovascular: Negative for chest pain, palpitations and leg swelling  Gastrointestinal: Positive for abdominal pain and nausea   Negative for abdominal distention, anorexia, blood in stool, constipation, diarrhea, flatus, hematemesis, hematochezia, melena and vomiting  Endocrine: Negative  Genitourinary: Negative  Negative for dysuria, flank pain, frequency, hematuria and urgency  Musculoskeletal: Negative  Negative for back pain  Skin: Negative  Allergic/Immunologic: Negative  Neurological: Negative  Negative for dizziness, syncope, weakness, light-headedness, numbness and headaches  Hematological: Negative  All other systems reviewed and are negative  Physical Exam  Physical Exam    Vital Signs  ED Triage Vitals [01/08/19 2025]   Temperature Pulse Respirations Blood Pressure SpO2   97 6 °F (36 4 °C) 68 20 (!) 191/79 97 %      Temp Source Heart Rate Source Patient Position - Orthostatic VS BP Location FiO2 (%)   Oral Monitor -- Right arm --      Pain Score       --           Vitals:    01/08/19 2025   BP: (!) 191/79   Pulse: 68       Visual Acuity      ED Medications  Medications   acetaminophen (TYLENOL) tablet 650 mg (not administered)       Diagnostic Studies  Results Reviewed     None                 No orders to display              Procedures  Procedures       Phone Contacts  ED Phone Contact    ED Course                               MDM  Number of Diagnoses or Management Options  Diagnosis management comments: 51-year-old female presents with recurrence of her epigastric discomfort and anxiety  This is a chronic and recurring problem for this patient  She has been evaluated numerous times in our emergency department along with [facility]  The most recent evaluation for this was 2 days ago where she had a stable CT scan of her abdomen and pelvis and normal blood work  On exam patient is alert and in no distress  She has no tenderness on exam of her abdomen  Given her very recent workup for this complaint and non concerning abdominal exam, I will not repeat blood work or imaging tonight  I did encourage patient to follow up with Gastroenterology regarding her chronic complaints    She also states she has reactions to a lot of medications, so I will not start her on any medications for her abdominal discomfort  Amount and/or Complexity of Data Reviewed  Review and summarize past medical records: yes      CritCare Time    Disposition  Final diagnoses:   Anxiety   Epigastric abdominal pain     Time reflects when diagnosis was documented in both MDM as applicable and the Disposition within this note     Time User Action Codes Description Comment    1/8/2019  9:02 PM Margarito Caldera Add [F41 9] Anxiety     1/8/2019  9:02 PM Hernandez BLACK Add [R10 13] Epigastric abdominal pain       ED Disposition     ED Disposition Condition Comment    Discharge  Yessyjewel Dru discharge to home/self care  Condition at discharge: Good        Follow-up Information     Follow up With Specialties Details Why Contact Info Additional Saman Montaño [facility] Schedule an appointment as soon as possible for a visit in 2 days  [place] [phone]  Albert Goldman [facility] [facility], [address] [place] [address], [place], [place], [phone]          Patient's Medications   Discharge Prescriptions    No medications on file     No discharge procedures on file      ED Provider  Electronically Signed by           Aby Vela DO  01/08/19 5988

## 2019-04-05 ENCOUNTER — HOSPITAL ENCOUNTER (EMERGENCY)
Facility: HOSPITAL | Age: 84
Discharge: HOME | End: 2019-04-05
Attending: EMERGENCY MEDICINE | Admitting: EMERGENCY MEDICINE
Payer: COMMERCIAL

## 2019-04-05 VITALS
OXYGEN SATURATION: 98 % | HEART RATE: 61 BPM | DIASTOLIC BLOOD PRESSURE: 70 MMHG | SYSTOLIC BLOOD PRESSURE: 132 MMHG | RESPIRATION RATE: 18 BRPM | TEMPERATURE: 98.9 F

## 2019-04-05 DIAGNOSIS — F41.0 ANXIETY ATTACK: Primary | ICD-10-CM

## 2019-04-05 PROCEDURE — 99283 EMERGENCY DEPT VISIT LOW MDM: CPT | Performed by: EMERGENCY MEDICINE

## 2019-04-05 PROCEDURE — 99284 EMERGENCY DEPT VISIT MOD MDM: CPT

## 2019-04-05 RX ORDER — SENNA PLUS 8.6 MG/1
1 TABLET ORAL 2 TIMES DAILY
COMMUNITY

## 2019-04-05 RX ORDER — ASPIRIN 81 MG/1
81 TABLET ORAL DAILY
COMMUNITY

## 2019-04-05 RX ORDER — FAMOTIDINE 20 MG/1
20 TABLET, FILM COATED ORAL 2 TIMES DAILY
COMMUNITY

## 2019-04-23 ENCOUNTER — HOSPITAL ENCOUNTER (EMERGENCY)
Facility: HOSPITAL | Age: 84
Discharge: HOME | End: 2019-04-23
Attending: EMERGENCY MEDICINE
Payer: COMMERCIAL

## 2019-04-23 ENCOUNTER — APPOINTMENT (EMERGENCY)
Dept: CT IMAGING | Facility: HOSPITAL | Age: 84
End: 2019-04-23
Payer: COMMERCIAL

## 2019-04-23 VITALS
BODY MASS INDEX: 22 KG/M2 | RESPIRATION RATE: 20 BRPM | HEART RATE: 68 BPM | TEMPERATURE: 98.5 F | SYSTOLIC BLOOD PRESSURE: 149 MMHG | OXYGEN SATURATION: 98 % | DIASTOLIC BLOOD PRESSURE: 65 MMHG | WEIGHT: 124.2 LBS

## 2019-04-23 DIAGNOSIS — R10.9 ABDOMINAL PAIN: Primary | ICD-10-CM

## 2019-04-23 LAB
ALBUMIN SERPL BCP-MCNC: 4 G/DL (ref 3.5–5)
ALP SERPL-CCNC: 84 U/L (ref 46–116)
ALT SERPL W P-5'-P-CCNC: 38 U/L (ref 12–78)
ANION GAP SERPL CALCULATED.3IONS-SCNC: 6 MMOL/L (ref 4–13)
AST SERPL W P-5'-P-CCNC: 21 U/L (ref 5–45)
BASOPHILS # BLD AUTO: 0.06 THOUSANDS/ΜL (ref 0–0.1)
BASOPHILS NFR BLD AUTO: 1 % (ref 0–1)
BILIRUB SERPL-MCNC: 0.79 MG/DL (ref 0.2–1)
BUN SERPL-MCNC: 23 MG/DL (ref 5–25)
CALCIUM SERPL-MCNC: 10.1 MG/DL (ref 8.3–10.1)
CHLORIDE SERPL-SCNC: 98 MMOL/L (ref 100–108)
CO2 SERPL-SCNC: 29 MMOL/L (ref 21–32)
CREAT SERPL-MCNC: 0.88 MG/DL (ref 0.6–1.3)
EOSINOPHIL # BLD AUTO: 0.29 THOUSAND/ΜL (ref 0–0.61)
EOSINOPHIL NFR BLD AUTO: 3 % (ref 0–6)
ERYTHROCYTE [DISTWIDTH] IN BLOOD BY AUTOMATED COUNT: 12.6 % (ref 11.6–15.1)
GFR SERPL CREATININE-BSD FRML MDRD: 60 ML/MIN/1.73SQ M
GLUCOSE SERPL-MCNC: 164 MG/DL (ref 65–140)
HCT VFR BLD AUTO: 38.1 % (ref 34.8–46.1)
HGB BLD-MCNC: 12.2 G/DL (ref 11.5–15.4)
IMM GRANULOCYTES # BLD AUTO: 0.02 THOUSAND/UL (ref 0–0.2)
IMM GRANULOCYTES NFR BLD AUTO: 0 % (ref 0–2)
LIPASE SERPL-CCNC: 140 U/L (ref 73–393)
LYMPHOCYTES # BLD AUTO: 3.69 THOUSANDS/ΜL (ref 0.6–4.47)
LYMPHOCYTES NFR BLD AUTO: 42 % (ref 14–44)
MCH RBC QN AUTO: 30.8 PG (ref 26.8–34.3)
MCHC RBC AUTO-ENTMCNC: 32 G/DL (ref 31.4–37.4)
MCV RBC AUTO: 96 FL (ref 82–98)
MONOCYTES # BLD AUTO: 0.8 THOUSAND/ΜL (ref 0.17–1.22)
MONOCYTES NFR BLD AUTO: 9 % (ref 4–12)
NEUTROPHILS # BLD AUTO: 4.03 THOUSANDS/ΜL (ref 1.85–7.62)
NEUTS SEG NFR BLD AUTO: 45 % (ref 43–75)
NRBC BLD AUTO-RTO: 0 /100 WBCS
PLATELET # BLD AUTO: 236 THOUSANDS/UL (ref 149–390)
PMV BLD AUTO: 8.9 FL (ref 8.9–12.7)
POTASSIUM SERPL-SCNC: 4.5 MMOL/L (ref 3.5–5.3)
PROT SERPL-MCNC: 7.8 G/DL (ref 6.4–8.2)
RBC # BLD AUTO: 3.96 MILLION/UL (ref 3.81–5.12)
SODIUM SERPL-SCNC: 133 MMOL/L (ref 136–145)
TROPONIN I SERPL-MCNC: <0.02 NG/ML
WBC # BLD AUTO: 8.89 THOUSAND/UL (ref 4.31–10.16)

## 2019-04-23 PROCEDURE — 85025 COMPLETE CBC W/AUTO DIFF WBC: CPT | Performed by: EMERGENCY MEDICINE

## 2019-04-23 PROCEDURE — 36415 COLL VENOUS BLD VENIPUNCTURE: CPT | Performed by: EMERGENCY MEDICINE

## 2019-04-23 PROCEDURE — 80053 COMPREHEN METABOLIC PANEL: CPT | Performed by: EMERGENCY MEDICINE

## 2019-04-23 PROCEDURE — 93005 ELECTROCARDIOGRAM TRACING: CPT

## 2019-04-23 PROCEDURE — 83690 ASSAY OF LIPASE: CPT | Performed by: EMERGENCY MEDICINE

## 2019-04-23 PROCEDURE — 84484 ASSAY OF TROPONIN QUANT: CPT | Performed by: EMERGENCY MEDICINE

## 2019-04-23 PROCEDURE — 99284 EMERGENCY DEPT VISIT MOD MDM: CPT

## 2019-04-23 PROCEDURE — 99283 EMERGENCY DEPT VISIT LOW MDM: CPT | Performed by: EMERGENCY MEDICINE

## 2019-04-23 RX ORDER — MAGNESIUM HYDROXIDE/ALUMINUM HYDROXICE/SIMETHICONE 120; 1200; 1200 MG/30ML; MG/30ML; MG/30ML
15 SUSPENSION ORAL ONCE
Status: COMPLETED | OUTPATIENT
Start: 2019-04-23 | End: 2019-04-23

## 2019-04-23 RX ORDER — SUCRALFATE ORAL 1 G/10ML
1000 SUSPENSION ORAL ONCE
Status: COMPLETED | OUTPATIENT
Start: 2019-04-23 | End: 2019-04-23

## 2019-04-23 RX ADMIN — SUCRALFATE 1000 MG: 1 SUSPENSION ORAL at 20:53

## 2019-04-23 RX ADMIN — LIDOCAINE HYDROCHLORIDE 15 ML: 20 SOLUTION ORAL; TOPICAL at 20:53

## 2019-04-23 RX ADMIN — ALUMINUM HYDROXIDE, MAGNESIUM HYDROXIDE, AND SIMETHICONE 15 ML: 200; 200; 20 SUSPENSION ORAL at 20:53

## 2019-04-24 LAB
ATRIAL RATE: 77 BPM
P AXIS: 92 DEGREES
PR INTERVAL: 226 MS
QRS AXIS: 99 DEGREES
QRSD INTERVAL: 138 MS
QT INTERVAL: 376 MS
QTC INTERVAL: 425 MS
T WAVE AXIS: 83 DEGREES
VENTRICULAR RATE: 77 BPM

## 2019-04-24 PROCEDURE — 93010 ELECTROCARDIOGRAM REPORT: CPT | Performed by: INTERNAL MEDICINE

## 2022-09-15 NOTE — DISCHARGE SUMMARY
Discharge Summary - [address] 80 y o  female MRN: [id number]    [place] [address] MED SURG Room / Bed: [address]5 -* Encounter: [id number]    BRIEF OVERVIEW      Admission Date: 2/4/2018       Discharge Date: 2/8/2018    Primary Diagnoses  Principal Problem:    Pneumonia due to infectious organism  Active Problems:    Hyponatremia    Type 2 diabetes mellitus (HCC)    Nausea & vomiting    CAD (coronary artery disease)    ALEJANDRA (generalized anxiety disorder)    Hyperlipidemia    HTN (hypertension), benign  Resolved Problems:    Acute metabolic encephalopathy    Service:  [facility]  Consulting Providers   none    Procedures Performed   none    Hospital Studies:  Urinalysis:  Negative nitrates/leukocyte esterase  Negative Legionella/strep pneumoniae antigen      Results from last 7 days  Lab Units 02/07/18  0515 02/05/18  0527 02/04/18  1836   WBC Thousand/uL 6 77 14 78* 10 78*   HEMOGLOBIN g/dL 9 3* 10 3* 10 5*   HEMATOCRIT % 28 8* 29 0* 30 6*   PLATELETS Thousands/uL 276 361 351       Results from last 7 days  Lab Units 02/08/18  0508 02/07/18  0515 02/07/18  0020  02/06/18  0613   SODIUM mmol/L 133* 132* 130*  < > 131*  131*   POTASSIUM mmol/L 3 8 3 5  --   --  3 4*   CHLORIDE mmol/L 99* 98*  --   --  97*   CO2 mmol/L 27 27  --   --  27   BUN mg/dL 14 6  --   --  5   CREATININE mg/dL 0 64 0 62  --   --  0 65   GLUCOSE RANDOM mg/dL 117 115  --   --  163*   CALCIUM mg/dL 8 1* 8 2*  --   --  8 1*   < > = values in this interval not displayed      History and Physical Exam:  Please refer to the Admission H&P note    Hospital Course by Problem:  1-acute/chronic hyponatremia: sodium on discharge on 2/2 for [facility] was 130   Her Na appears to range 128-130   Her [facility] records reveal history of chronic hyponatremia due to SIADH   Her serum osm was low               -patient was started on normal saline   Her sodium has gradually corrected to her baseline  She was taken off IV fluids yesterday and her sodium remains at her baseline at 133              -patient has a history of chronic hyponatremia  Orvel Poncho will be referred back to her primary care physician at [facility]  [address] a new workup for her chronic hyponatremia at this time      2-rydfeidik-mephphve pneumonia:  Patient was noted to have a right lower lobe pneumonia on admission   On her prior admissions to [facility] earlier this week she was noted to have infiltrate involving both bases   Patient had not been compliant with her Levaquin dose               -patient's case was discussed with the pulmonologist  Donavan Devriesw her current imaging she has a small infiltrate at the right base   This is likely related to her previously diagnosed pneumonia rather than a new pneumonia   As patient had possible GI upset related to her levofloxacin, her antibiotics were changed to ceftriaxone   Patient had presented with nausea and vomiting   It is possible that this infiltrate is related to aspiration pneumonia/pneumonitis  Mary Boggs as she is clinically improving the pulmonary service does not recommend any anaerobic coverage such as Flagyl to her regimen                 -ARIN is currently afebrile   She denies any shortness of breath or cough  Her lungs are clear  Was d/c from [facility] 2/1 with 5 additional days of levoflox pending  She has completed 4 days of cephalophorin to complete her course of abx at this time  Pt is refusing further abx at home regardless as she feels the abx precipitate her nausea       3-no dysphagia: Ernst Leaf were concerns that patient was exhibiting signs of dysphagia   In light of her right lower lobe infiltrate which could be representative of aspiration, patient was evaluated by speech therapy  They did not note any concerns of aspiration    She is currently on a regular diet with thin liquids      4-acute metabolic encephalopathy:  Patient reportedly had confusion upon presentation   This was felt to be secondary to acute toxic/metabolic encephalopathy secondary to her significant hyponatremia   Her sodium was 112   Has improved to 133  She is currently awake, alert, and oriented      5-s/p nausea/vomiting:  Patient presented with nausea and vomiting  Lore Pearson of her [facility] charts, it is notable that she has frequent admissions and ER visits and nausea and vomiting are present during those visits   I reviewed her family physicians notes, any relates the patient has significant anxiety, which manifests frequently as nausea   This had improved when she was placed on higher doses of Xanax in addition to her Zoloft   Patient's niece has also noted the correlation of nausea with patient's episodes of anxiety               -QOQSLEI declines GI workup  She relates her nausea is primarily associated with acute illnesses or antibiotics  -pt notes she is tolerating po  Ate entire lunch  No additional episodes of N/V      6-diabetes type 2:  On diet therapy as an outpatient  Pt has been refusing SSI  outpt follow up     7-essential hypertension:   continue metoprolol 100 mg b i d , Norvasc 10 mg daily  blood pressure is currently adequately controlled  Her systolic blood pressures been ranging 120-130s  Continue outpatient follow-up  Addendum:  Her blood pressure is in the adequate range off of her Micardis  Will discharge her therefore only on the metoprolol and Norvasc      8-1 2 cm cystic lesion in the pancreatic head:  Incidental finding   Will for back to primary care provider for further follow-up     9-ambulatory dysfunction/generalized weakness:  Noted on admission   Secondary to her hyponatremia   Has markedly improved   Patient worked with physical therapy  They recommend patient consider inpatient short-term rehab  Patient adamantly refuses this  I discussed this with the patient's niece Kirk Sawyer as well    Patient therefore will be discharged home with home physical therapy and home nursing      10-coronary artery disease:  Without any evidence of acute ischemia   Continue aspirin, beta-blocker, and statin      11-generalized anxiety disorder:   continue home Xanax   Patient's dose was recently increased and she relates that she takes it scheduled 0 25 mg t i d  discussed with patient's niece Chon Driscoll that will need to continue monitor as an outpatient this higher dose Xanax is affecting her mental processing  Will recommend patient that she takes this only as as needed basis at home      12-hyperlipidemia:  Continue statin    13-s/p constipation:  Pt had constipation yesterday  Received colace and miralax  Had large, soft BM today with gas  Likely due to laxative  Bowel regimen held today      14-family:  called niece Chon Driscoll and gave update  D/w her pt will need a more supervised living environment in the near future  Chon Driscoll notes the family has been discussing that      Dispo:  Pt refuses inpt STR  Will dc home and resume [facility] home care nursing and PT         VTE Pharmacologic Prophylaxis: Heparin  VTE Mechanical Prophylaxis: sequential compression device       Discharge Condition: Improved  Discharge Disposition:  Discharge home    Discharge Note and Physical Exam:   Pt denies any pain anywhere  She notes that she took the laxative yesterday  Notes she had a very large, soft BM this morning  Denies any diarrhea  Complains of abdominal gas and bloating  States she was able to tolerate her full lunch including grilled cheese  She denies any pain anywhere at all  She denies any shortness of breath or cough  She denies any dizziness or lightheadedness  She is tolerating p o  Caio Side Vitals:    02/08/18 0700   BP: 124/74   Pulse: 64   Resp: 18   Temp: 98 °F (36 7 °C)   SpO2: 96%     General:  Pleasant, non-tachypnic, non-dyspnic  Conversant  Sitting up in the chair  Heart: Regular rate and rhythm, S1S2 present    No murmur, rub or gallop  Lungs: Clear to auscultation bilaterally, no wheezing, rhonchi, or crackles  Good air movement  No accessory muscle use or respiratory distress  Abdomen: soft, non-tender, non-distended, NABS  No rebound or guarding  No mass or peritoneal signs  Extremities: no clubbing, cyanosis or edema  2+ pedal pulses bilaterally  Neurologic:  Awake alert  Fluent and goal directed speech  Strength globally intact  Cranial nerves 2-12 intact  Skin: warm and dry  No petechiae, purpura or rash  Discharge Medications   Please see Medical Reconciliation Discharge Form    Discharge Follow Up Appointments:   [facility], [facility]/ Dr Bella Booth 1 week    Discharge  Statement   Total Time Spent today including physical exam, discussion with patient and her niece, her nurse and , and discharge arrangements/care = 35 minutes  Called her PCP office- spoke with her PCP DR Calixto and gave update on hospital course  He will see her in the office and follow up      This note has been constructed using a voice recognition system Domestic partner